# Patient Record
Sex: FEMALE | Race: WHITE | NOT HISPANIC OR LATINO | Employment: OTHER | URBAN - METROPOLITAN AREA
[De-identification: names, ages, dates, MRNs, and addresses within clinical notes are randomized per-mention and may not be internally consistent; named-entity substitution may affect disease eponyms.]

---

## 2023-06-13 ENCOUNTER — TELEPHONE (OUTPATIENT)
Dept: PRIMARY CARE | Facility: CLINIC | Age: 88
End: 2023-06-13
Payer: MEDICARE

## 2023-06-13 NOTE — TELEPHONE ENCOUNTER
She needs to take it daily and then next week at her visit we can discuss changing it to something else due to side effects  Would not risk having a stroke by leaving BP so high

## 2023-06-13 NOTE — TELEPHONE ENCOUNTER
She needs to take it daily and then next week at her visit we can discuss changing it to something else due to side effects  Would not risk having a stroke by leaving BP so high     Patient informed of the above per Dr Agrawal, will take medication daily and follow up next week

## 2023-06-13 NOTE — TELEPHONE ENCOUNTER
Patient called  to inform you that Blood pressure was 170/68 today at [facility], she has not been taking her medicine because it makes her sick to her stomach, she did take today, because [facility] wanted her to go to hospital or call her PCP, She was at [facility] in April and passed out they called the squad and she refused to go to ED she told them I am not going to die in a hospital I will die at home. She then said sometimes she takes every other day her BP med.      Her H&P is next week June 20th. Should she take daily or continue every other day until she sees you next week?

## 2023-06-16 PROBLEM — M51.36 DDD (DEGENERATIVE DISC DISEASE), LUMBAR: Status: ACTIVE | Noted: 2023-06-16

## 2023-06-16 PROBLEM — I49.3 PVC (PREMATURE VENTRICULAR CONTRACTION): Status: ACTIVE | Noted: 2023-06-16

## 2023-06-16 PROBLEM — M25.511 RIGHT SHOULDER PAIN: Status: ACTIVE | Noted: 2023-06-16

## 2023-06-16 PROBLEM — I10 BENIGN ESSENTIAL HYPERTENSION: Status: ACTIVE | Noted: 2023-06-16

## 2023-06-16 PROBLEM — K57.90 DIVERTICULOSIS: Status: ACTIVE | Noted: 2023-06-16

## 2023-06-16 PROBLEM — N18.31 STAGE 3A CHRONIC KIDNEY DISEASE (MULTI): Status: ACTIVE | Noted: 2023-06-16

## 2023-06-16 RX ORDER — COD LIVER OIL
OIL (ML) ORAL
COMMUNITY
End: 2023-06-20 | Stop reason: ALTCHOICE

## 2023-06-16 RX ORDER — ASPIRIN 81 MG/1
TABLET ORAL
COMMUNITY
End: 2023-07-11 | Stop reason: ALTCHOICE

## 2023-06-16 RX ORDER — CHLORTHALIDONE 25 MG/1
1 TABLET ORAL DAILY
COMMUNITY
Start: 2022-12-22 | End: 2023-06-20 | Stop reason: SINTOL

## 2023-06-16 RX ORDER — ACETAMINOPHEN 500 MG
TABLET ORAL
COMMUNITY
End: 2023-07-11 | Stop reason: ALTCHOICE

## 2023-06-20 ENCOUNTER — OFFICE VISIT (OUTPATIENT)
Dept: PRIMARY CARE | Facility: CLINIC | Age: 88
End: 2023-06-20
Payer: MEDICARE

## 2023-06-20 VITALS
HEIGHT: 62 IN | SYSTOLIC BLOOD PRESSURE: 142 MMHG | BODY MASS INDEX: 28.52 KG/M2 | WEIGHT: 155 LBS | DIASTOLIC BLOOD PRESSURE: 80 MMHG | RESPIRATION RATE: 23 BRPM | HEART RATE: 80 BPM

## 2023-06-20 DIAGNOSIS — M51.36 DDD (DEGENERATIVE DISC DISEASE), LUMBAR: ICD-10-CM

## 2023-06-20 DIAGNOSIS — E78.2 MIXED HYPERLIPIDEMIA: ICD-10-CM

## 2023-06-20 DIAGNOSIS — N18.31 STAGE 3A CHRONIC KIDNEY DISEASE (MULTI): ICD-10-CM

## 2023-06-20 DIAGNOSIS — Z00.00 MEDICARE ANNUAL WELLNESS VISIT, SUBSEQUENT: Primary | ICD-10-CM

## 2023-06-20 DIAGNOSIS — I49.3 PVC (PREMATURE VENTRICULAR CONTRACTION): ICD-10-CM

## 2023-06-20 DIAGNOSIS — I10 BENIGN ESSENTIAL HYPERTENSION: ICD-10-CM

## 2023-06-20 DIAGNOSIS — C50.012: ICD-10-CM

## 2023-06-20 PROCEDURE — 1160F RVW MEDS BY RX/DR IN RCRD: CPT | Performed by: INTERNAL MEDICINE

## 2023-06-20 PROCEDURE — 80061 LIPID PANEL: CPT

## 2023-06-20 PROCEDURE — 81001 URINALYSIS AUTO W/SCOPE: CPT

## 2023-06-20 PROCEDURE — 85027 COMPLETE CBC AUTOMATED: CPT

## 2023-06-20 PROCEDURE — 80053 COMPREHEN METABOLIC PANEL: CPT

## 2023-06-20 PROCEDURE — 1036F TOBACCO NON-USER: CPT | Performed by: INTERNAL MEDICINE

## 2023-06-20 PROCEDURE — G0439 PPPS, SUBSEQ VISIT: HCPCS | Performed by: INTERNAL MEDICINE

## 2023-06-20 PROCEDURE — 1170F FXNL STATUS ASSESSED: CPT | Performed by: INTERNAL MEDICINE

## 2023-06-20 PROCEDURE — 3079F DIAST BP 80-89 MM HG: CPT | Performed by: INTERNAL MEDICINE

## 2023-06-20 PROCEDURE — 3077F SYST BP >= 140 MM HG: CPT | Performed by: INTERNAL MEDICINE

## 2023-06-20 PROCEDURE — 99214 OFFICE O/P EST MOD 30 MIN: CPT | Performed by: INTERNAL MEDICINE

## 2023-06-20 PROCEDURE — 1159F MED LIST DOCD IN RCRD: CPT | Performed by: INTERNAL MEDICINE

## 2023-06-20 RX ORDER — LOSARTAN POTASSIUM 50 MG/1
50 TABLET ORAL DAILY
Qty: 90 TABLET | Refills: 1 | Status: SHIPPED
Start: 2023-06-20 | End: 2023-07-11 | Stop reason: SDUPTHER

## 2023-06-20 ASSESSMENT — ENCOUNTER SYMPTOMS
ABDOMINAL PAIN: 0
MYALGIAS: 0
EYE PAIN: 0
RECTAL PAIN: 0
HEMATURIA: 0
STRIDOR: 0
APNEA: 0
CONFUSION: 0
APPETITE CHANGE: 0
BLOOD IN STOOL: 0
PHOTOPHOBIA: 0
CHILLS: 0
TREMORS: 0
SHORTNESS OF BREATH: 0
JOINT SWELLING: 0
VOICE CHANGE: 0
TROUBLE SWALLOWING: 0
CONSTIPATION: 0
FACIAL ASYMMETRY: 0
WHEEZING: 0
EYE ITCHING: 0
HALLUCINATIONS: 0
OCCASIONAL FEELINGS OF UNSTEADINESS: 0
PALPITATIONS: 0
CHOKING: 0
BRUISES/BLEEDS EASILY: 0
WEAKNESS: 0
ARTHRALGIAS: 0
DIFFICULTY URINATING: 0
FREQUENCY: 0
AGITATION: 0
SLEEP DISTURBANCE: 0
POLYDIPSIA: 0
HEADACHES: 0
SINUS PRESSURE: 0
COUGH: 0
SEIZURES: 0
DEPRESSION: 0
DYSPHORIC MOOD: 0
NERVOUS/ANXIOUS: 0
FACIAL SWELLING: 0
DIARRHEA: 0
ADENOPATHY: 0
CHEST TIGHTNESS: 0
NAUSEA: 0
FATIGUE: 0
SORE THROAT: 0
FLANK PAIN: 0
DYSURIA: 0
SINUS PAIN: 0
NUMBNESS: 0
VOMITING: 0
SPEECH DIFFICULTY: 0
DIAPHORESIS: 0
POLYPHAGIA: 0
BACK PAIN: 0
ABDOMINAL DISTENTION: 0
ACTIVITY CHANGE: 0
EYE REDNESS: 0
DECREASED CONCENTRATION: 0
HYPERACTIVE: 0
FEVER: 0
UNEXPECTED WEIGHT CHANGE: 0
LIGHT-HEADEDNESS: 1
WOUND: 0
LOSS OF SENSATION IN FEET: 0
EYE DISCHARGE: 0
NECK STIFFNESS: 0
NECK PAIN: 0
ANAL BLEEDING: 0
COLOR CHANGE: 0
RHINORRHEA: 0
DIZZINESS: 0

## 2023-06-20 ASSESSMENT — ACTIVITIES OF DAILY LIVING (ADL)
TAKING_MEDICATION: INDEPENDENT
DRESSING: INDEPENDENT
DOING_HOUSEWORK: INDEPENDENT
GROCERY_SHOPPING: INDEPENDENT
MANAGING_FINANCES: INDEPENDENT
BATHING: INDEPENDENT

## 2023-06-20 ASSESSMENT — COLUMBIA-SUICIDE SEVERITY RATING SCALE - C-SSRS
1. IN THE PAST MONTH, HAVE YOU WISHED YOU WERE DEAD OR WISHED YOU COULD GO TO SLEEP AND NOT WAKE UP?: NO
6. HAVE YOU EVER DONE ANYTHING, STARTED TO DO ANYTHING, OR PREPARED TO DO ANYTHING TO END YOUR LIFE?: NO
2. HAVE YOU ACTUALLY HAD ANY THOUGHTS OF KILLING YOURSELF?: NO

## 2023-06-20 ASSESSMENT — PATIENT HEALTH QUESTIONNAIRE - PHQ9
SUM OF ALL RESPONSES TO PHQ9 QUESTIONS 1 AND 2: 0
2. FEELING DOWN, DEPRESSED OR HOPELESS: NOT AT ALL
2. FEELING DOWN, DEPRESSED OR HOPELESS: NOT AT ALL
1. LITTLE INTEREST OR PLEASURE IN DOING THINGS: NOT AT ALL
1. LITTLE INTEREST OR PLEASURE IN DOING THINGS: NOT AT ALL
SUM OF ALL RESPONSES TO PHQ9 QUESTIONS 1 AND 2: 0

## 2023-06-20 NOTE — PATIENT INSTRUCTIONS
Stop Chlorthalidone today  Instead start Losartan 50 mg one pill daily (this new script is at [facility]'s pharmacy)-this is for your blood pressure (start this medication tomorrow)   We did blood work today and will call with results  Watch diet-low in salt in particular  Continue checking your BP at local rec center as able  Follow up here in 3 weeks for BP check

## 2023-06-20 NOTE — ASSESSMENT & PLAN NOTE
GFR was 47 in 1/2023  On Chlorthalidone at present but having side effects  Stopping use of diuretic and will try ARB instead

## 2023-06-20 NOTE — PROGRESS NOTES
Subjective   Reason for Visit: Nehal Walden is an 88 y.o. female here for a Medicare Wellness visit.          Reviewed all medications by prescribing practitioner or clinical pharmacist (such as prescriptions, OTCs, herbal therapies and supplements) and documented in the medical record.    HPI  Pt here for MWV.  She is having issues with her current BP medication.  She feels the chlorthalidone is causing her to not urinate.      She fell recently while at [facility] (4/2023).  She thinks she passed out-this occurred after she went in sauna.  She then put her feet into hot tub and the next thing she remembers is EMS standing over her asking to transport her to hospital.  She declined going to hospital.      She has a list of BP readings from screenings she has had at local [facility]-they have nurse that comes monthly.  She has one that is 179/92, 184/94, 160/68.  She doesn't want to go to ER.      Patient Care Team:  Ana Laura ESTEVEZ DO as PCP - General  Ana Laura ESTEVEZ DO as PCP - [facility] Attributed Provider     Review of Systems   Constitutional:  Negative for activity change, appetite change, chills, diaphoresis, fatigue, fever and unexpected weight change.   HENT:  Negative for congestion, dental problem, drooling, ear discharge, ear pain, facial swelling, hearing loss, mouth sores, nosebleeds, postnasal drip, rhinorrhea, sinus pressure, sinus pain, sneezing, sore throat, tinnitus, trouble swallowing and voice change.    Eyes:  Negative for photophobia, pain, discharge, redness, itching and visual disturbance.   Respiratory:  Negative for apnea, cough, choking, chest tightness, shortness of breath, wheezing and stridor.    Cardiovascular:  Negative for chest pain, palpitations and leg swelling.   Gastrointestinal:  Negative for abdominal distention, abdominal pain, anal bleeding, blood in stool, constipation, diarrhea, nausea, rectal pain and vomiting.   Endocrine: Negative for cold intolerance, heat intolerance,  "polydipsia, polyphagia and polyuria.   Genitourinary:  Negative for decreased urine volume, difficulty urinating, dyspareunia, dysuria, enuresis, flank pain, frequency, genital sores, hematuria, menstrual problem, pelvic pain, urgency, vaginal bleeding, vaginal discharge and vaginal pain.   Musculoskeletal:  Negative for arthralgias, back pain, gait problem, joint swelling, myalgias, neck pain and neck stiffness.   Skin:  Negative for color change, pallor, rash and wound.   Allergic/Immunologic: Negative for environmental allergies.   Neurological:  Positive for light-headedness. Negative for dizziness, tremors, seizures, syncope, facial asymmetry, speech difficulty, weakness, numbness and headaches.   Hematological:  Negative for adenopathy. Does not bruise/bleed easily.   Psychiatric/Behavioral:  Negative for agitation, behavioral problems, confusion, decreased concentration, dysphoric mood, hallucinations, self-injury, sleep disturbance and suicidal ideas. The patient is not nervous/anxious and is not hyperactive.        Objective   Vitals:  /80   Pulse 80   Resp 23   Ht 1.575 m (5' 2\")   Wt 70.3 kg (155 lb)   BMI 28.35 kg/m²       Physical Exam  Constitutional:       Appearance: Normal appearance.   HENT:      Head: Normocephalic and atraumatic.      Right Ear: Tympanic membrane, ear canal and external ear normal. There is no impacted cerumen.      Left Ear: Tympanic membrane, ear canal and external ear normal. There is no impacted cerumen.      Nose: Nose normal. No congestion or rhinorrhea.      Mouth/Throat:      Mouth: Mucous membranes are moist.      Pharynx: Oropharynx is clear. No oropharyngeal exudate or posterior oropharyngeal erythema.   Eyes:      Extraocular Movements: Extraocular movements intact.      Conjunctiva/sclera: Conjunctivae normal.      Pupils: Pupils are equal, round, and reactive to light.   Neck:      Vascular: No carotid bruit.   Cardiovascular:      Rate and Rhythm: Normal " rate and regular rhythm.      Pulses: Normal pulses.      Heart sounds: Normal heart sounds. No murmur heard.  Pulmonary:      Effort: Pulmonary effort is normal. No respiratory distress.      Breath sounds: Normal breath sounds. No wheezing, rhonchi or rales.   Abdominal:      General: Abdomen is flat. Bowel sounds are normal. There is no distension.      Palpations: Abdomen is soft.      Tenderness: There is no abdominal tenderness.      Hernia: No hernia is present.   Musculoskeletal:         General: No swelling or tenderness. Normal range of motion.      Cervical back: Normal range of motion and neck supple.      Right lower leg: No edema.      Left lower leg: No edema.   Lymphadenopathy:      Cervical: No cervical adenopathy.   Skin:     General: Skin is warm and dry.      Findings: No lesion or rash.   Neurological:      General: No focal deficit present.      Mental Status: She is alert and oriented to person, place, and time.      Cranial Nerves: No cranial nerve deficit.      Sensory: No sensory deficit.      Motor: No weakness.   Psychiatric:         Mood and Affect: Mood normal.         Behavior: Behavior normal.         Thought Content: Thought content normal.         Judgment: Judgment normal.         Assessment/Plan   Problem List Items Addressed This Visit          Circulatory    Benign essential hypertension    Current Assessment & Plan     Pt having side effects from Chlorthalidone  Will stop use and start Losartan 50 mg daily instead   Encouraged low salt diet  Continue to exercise regularly   Follow up in 3 weeks for BP check          Relevant Medications    losartan (Cozaar) 50 mg tablet    Other Relevant Orders    Comprehensive Metabolic Panel    CBC    Urinalysis with Reflex Microscopic    PVC (premature ventricular contraction)       Genitourinary    Stage 3a chronic kidney disease    Current Assessment & Plan     GFR was 47 in 1/2023  On Chlorthalidone at present but having side  effects  Stopping use of diuretic and will try ARB instead          Relevant Orders    CBC    Urinalysis with Reflex Microscopic       Musculoskeletal    DDD (degenerative disc disease), lumbar    Current Assessment & Plan     No current issues  Goes to rec for exercise regularly             Other    Medicare annual wellness visit, subsequent - Primary    Current Assessment & Plan     Pt has living will and we have copy  She is not into having vaccines and declines  She doesn't wish to do any screening measures at this time          Other Visit Diagnoses       Mixed hyperlipidemia        Relevant Orders    Lipid Panel

## 2023-06-20 NOTE — ASSESSMENT & PLAN NOTE
Pt having side effects from Chlorthalidone  Will stop use and start Losartan 50 mg daily instead   Encouraged low salt diet  Continue to exercise regularly   Follow up in 3 weeks for BP check

## 2023-06-20 NOTE — ASSESSMENT & PLAN NOTE
Past history but per patient had surgery for this-unclear full history as it is all through patient encounter   No current issues  She doesn't wish to have any further imaging

## 2023-06-20 NOTE — ASSESSMENT & PLAN NOTE
Pt has living will and we have copy  She is not into having vaccines and declines  She doesn't wish to do any screening measures at this time

## 2023-06-21 LAB
ALANINE AMINOTRANSFERASE (SGPT) (U/L) IN SER/PLAS: 14 U/L (ref 7–45)
ALBUMIN (G/DL) IN SER/PLAS: 4.7 G/DL (ref 3.4–5)
ALKALINE PHOSPHATASE (U/L) IN SER/PLAS: 65 U/L (ref 33–136)
ANION GAP IN SER/PLAS: 13 MMOL/L (ref 10–20)
APPEARANCE, URINE: CLEAR
ASPARTATE AMINOTRANSFERASE (SGOT) (U/L) IN SER/PLAS: 20 U/L (ref 9–39)
BILIRUBIN TOTAL (MG/DL) IN SER/PLAS: 0.8 MG/DL (ref 0–1.2)
BILIRUBIN, URINE: NEGATIVE
BLOOD, URINE: NEGATIVE
CALCIUM (MG/DL) IN SER/PLAS: 9.8 MG/DL (ref 8.6–10.6)
CARBON DIOXIDE, TOTAL (MMOL/L) IN SER/PLAS: 30 MMOL/L (ref 21–32)
CHLORIDE (MMOL/L) IN SER/PLAS: 94 MMOL/L (ref 98–107)
CHOLESTEROL (MG/DL) IN SER/PLAS: 233 MG/DL (ref 0–199)
CHOLESTEROL IN HDL (MG/DL) IN SER/PLAS: 55.7 MG/DL
CHOLESTEROL/HDL RATIO: 4.2
COLOR, URINE: YELLOW
CREATININE (MG/DL) IN SER/PLAS: 0.98 MG/DL (ref 0.5–1.05)
ERYTHROCYTE DISTRIBUTION WIDTH (RATIO) BY AUTOMATED COUNT: 15.1 % (ref 11.5–14.5)
ERYTHROCYTE MEAN CORPUSCULAR HEMOGLOBIN CONCENTRATION (G/DL) BY AUTOMATED: 32 G/DL (ref 32–36)
ERYTHROCYTE MEAN CORPUSCULAR VOLUME (FL) BY AUTOMATED COUNT: 89 FL (ref 80–100)
ERYTHROCYTES (10*6/UL) IN BLOOD BY AUTOMATED COUNT: 4.35 X10E12/L (ref 4–5.2)
GFR FEMALE: 56 ML/MIN/1.73M2
GLUCOSE (MG/DL) IN SER/PLAS: 103 MG/DL (ref 74–99)
GLUCOSE, URINE: NEGATIVE MG/DL
HEMATOCRIT (%) IN BLOOD BY AUTOMATED COUNT: 38.8 % (ref 36–46)
HEMOGLOBIN (G/DL) IN BLOOD: 12.4 G/DL (ref 12–16)
KETONES, URINE: NEGATIVE MG/DL
LDL: 159 MG/DL (ref 0–99)
LEUKOCYTE ESTERASE, URINE: ABNORMAL
LEUKOCYTES (10*3/UL) IN BLOOD BY AUTOMATED COUNT: 5.2 X10E9/L (ref 4.4–11.3)
NITRITE, URINE: NEGATIVE
NRBC (PER 100 WBCS) BY AUTOMATED COUNT: 0 /100 WBC (ref 0–0)
PH, URINE: 7 (ref 5–8)
PLATELETS (10*3/UL) IN BLOOD AUTOMATED COUNT: 248 X10E9/L (ref 150–450)
POTASSIUM (MMOL/L) IN SER/PLAS: 3.9 MMOL/L (ref 3.5–5.3)
PROTEIN TOTAL: 7.2 G/DL (ref 6.4–8.2)
PROTEIN, URINE: NEGATIVE MG/DL
RBC, URINE: <1 /HPF (ref 0–5)
SODIUM (MMOL/L) IN SER/PLAS: 133 MMOL/L (ref 136–145)
SPECIFIC GRAVITY, URINE: 1.01 (ref 1–1.03)
SQUAMOUS EPITHELIAL CELLS, URINE: 2 /HPF
TRIGLYCERIDE (MG/DL) IN SER/PLAS: 90 MG/DL (ref 0–149)
UREA NITROGEN (MG/DL) IN SER/PLAS: 20 MG/DL (ref 6–23)
UROBILINOGEN, URINE: <2 MG/DL (ref 0–1.9)
VLDL: 18 MG/DL (ref 0–40)
WBC, URINE: 20 /HPF (ref 0–5)

## 2023-06-29 ENCOUNTER — TELEPHONE (OUTPATIENT)
Dept: PRIMARY CARE | Facility: CLINIC | Age: 88
End: 2023-06-29
Payer: MEDICARE

## 2023-06-29 NOTE — TELEPHONE ENCOUNTER
Have her try taking 1/2 to the current medication-so Losartan 25 mg daily-cut her pill in half and take 1/2 tablet daily and see if this is tolerable

## 2023-06-29 NOTE — TELEPHONE ENCOUNTER
Patient called and is unable to take medication given to her for blood pressure.  She is having naussea, her legs feel like they are on fire and she gets dizzy.  She is going to discontinue this med.  What do you recommend?  She seems very anxious and concerned about not being able to tolerate any medications.

## 2023-06-30 RX ORDER — ANASTROZOLE 1 MG/1
1 TABLET ORAL
COMMUNITY
Start: 2016-03-03 | End: 2023-07-11 | Stop reason: ALTCHOICE

## 2023-07-11 ENCOUNTER — OFFICE VISIT (OUTPATIENT)
Dept: PRIMARY CARE | Facility: CLINIC | Age: 88
End: 2023-07-11
Payer: MEDICARE

## 2023-07-11 VITALS
SYSTOLIC BLOOD PRESSURE: 150 MMHG | BODY MASS INDEX: 28.72 KG/M2 | DIASTOLIC BLOOD PRESSURE: 74 MMHG | RESPIRATION RATE: 22 BRPM | HEART RATE: 84 BPM | WEIGHT: 157 LBS

## 2023-07-11 DIAGNOSIS — I10 BENIGN ESSENTIAL HYPERTENSION: Primary | ICD-10-CM

## 2023-07-11 DIAGNOSIS — E87.1 HYPONATREMIA: ICD-10-CM

## 2023-07-11 PROCEDURE — 3077F SYST BP >= 140 MM HG: CPT | Performed by: INTERNAL MEDICINE

## 2023-07-11 PROCEDURE — 1160F RVW MEDS BY RX/DR IN RCRD: CPT | Performed by: INTERNAL MEDICINE

## 2023-07-11 PROCEDURE — 1159F MED LIST DOCD IN RCRD: CPT | Performed by: INTERNAL MEDICINE

## 2023-07-11 PROCEDURE — 1126F AMNT PAIN NOTED NONE PRSNT: CPT | Performed by: INTERNAL MEDICINE

## 2023-07-11 PROCEDURE — 3078F DIAST BP <80 MM HG: CPT | Performed by: INTERNAL MEDICINE

## 2023-07-11 PROCEDURE — 1036F TOBACCO NON-USER: CPT | Performed by: INTERNAL MEDICINE

## 2023-07-11 PROCEDURE — 99213 OFFICE O/P EST LOW 20 MIN: CPT | Performed by: INTERNAL MEDICINE

## 2023-07-11 PROCEDURE — 80048 BASIC METABOLIC PNL TOTAL CA: CPT

## 2023-07-11 RX ORDER — LOSARTAN POTASSIUM 50 MG/1
25 TABLET ORAL DAILY
Qty: 90 TABLET | Refills: 1
Start: 2023-07-11 | End: 2023-09-12

## 2023-07-11 ASSESSMENT — ENCOUNTER SYMPTOMS
NAUSEA: 0
DIARRHEA: 0
SHORTNESS OF BREATH: 0
FEVER: 0
ABDOMINAL PAIN: 0
CHEST TIGHTNESS: 0
VOMITING: 0
CONFUSION: 0
CHILLS: 0
COUGH: 0
DYSPHORIC MOOD: 0
NERVOUS/ANXIOUS: 0
CONSTIPATION: 0
DECREASED CONCENTRATION: 1
PALPITATIONS: 0
ABDOMINAL DISTENTION: 0
WHEEZING: 0
FATIGUE: 0

## 2023-07-11 NOTE — PATIENT INSTRUCTIONS
Increase dosing to 1/2 tablet in AM and 1/2 tablet in PM  See that you can tolerate this way to dose-without headaches/lightheaded/nauseous  Continue to have the rec center take your BP and write it down and bring to me (goal is <140/90 ideally)  We did labs today and will call with results  Follow up in 3 months (sooner if needed)

## 2023-07-11 NOTE — PROGRESS NOTES
Subjective   Patient ID: Nehal Walden is a 88 y.o. female who presents for Blood Pressure Check.    HPI  Pt here in follow up to HTN.  She was seen on 6/20 and we started her on Losartan 50 mg.  She tells me initially she felt nauseous and dizzy.  She called on 6/29 and we told her to try 1/2 tablet daily.  She tells me she is tolerating this dose better.  She notes when her BP is high she has cloudiness in her head.  She just talked to her sister who lives in [place] and she also has very malignant and hard to control HTN.      Review of Systems   Constitutional:  Negative for chills, fatigue and fever.   Respiratory:  Negative for cough, chest tightness, shortness of breath and wheezing.    Cardiovascular:  Negative for chest pain, palpitations and leg swelling.   Gastrointestinal:  Negative for abdominal distention, abdominal pain, constipation, diarrhea, nausea and vomiting.   Psychiatric/Behavioral:  Positive for decreased concentration (when BP high). Negative for confusion (when BP high) and dysphoric mood. The patient is not nervous/anxious.        Objective   /74   Pulse 84   Resp 22   Wt 71.2 kg (157 lb)   BMI 28.72 kg/m²    Physical Exam  Constitutional:       Appearance: Normal appearance.   Cardiovascular:      Rate and Rhythm: Normal rate and regular rhythm.      Heart sounds: Normal heart sounds.   Pulmonary:      Effort: Pulmonary effort is normal.      Breath sounds: Normal breath sounds.   Abdominal:      General: Bowel sounds are normal.   Musculoskeletal:      Right lower leg: No edema.      Left lower leg: No edema.   Neurological:      Mental Status: She is alert and oriented to person, place, and time.   Psychiatric:         Mood and Affect: Mood normal.         Assessment/Plan   Problem List Items Addressed This Visit       Benign essential hypertension - Primary     BP still high today; she claims at local rec center yesterday it was very good  She is tolerating 25 mg of Losartan  currently  I want her to try to increase dosing to 1/2 tablet twice a day to get further improvements  She was instructed to continue to have her BP checked while at rec and record it for me to view at next visit  BMP done today to check creatine          Relevant Medications    losartan (Cozaar) 50 mg tablet    Other Relevant Orders    Basic Metabolic Panel    Follow Up In Primary Care - Established     Other Visit Diagnoses       Hyponatremia        Relevant Orders    Basic Metabolic Panel

## 2023-07-11 NOTE — ASSESSMENT & PLAN NOTE
BP still high today; she claims at local rec center yesterday it was very good  She is tolerating 25 mg of Losartan currently  I want her to try to increase dosing to 1/2 tablet twice a day to get further improvements  She was instructed to continue to have her BP checked while at rec and record it for me to view at next visit  BMP done today to check creatine

## 2023-07-12 LAB
ANION GAP IN SER/PLAS: 14 MMOL/L (ref 10–20)
CALCIUM (MG/DL) IN SER/PLAS: 9.7 MG/DL (ref 8.6–10.6)
CARBON DIOXIDE, TOTAL (MMOL/L) IN SER/PLAS: 27 MMOL/L (ref 21–32)
CHLORIDE (MMOL/L) IN SER/PLAS: 98 MMOL/L (ref 98–107)
CREATININE (MG/DL) IN SER/PLAS: 1.06 MG/DL (ref 0.5–1.05)
GFR FEMALE: 51 ML/MIN/1.73M2
GLUCOSE (MG/DL) IN SER/PLAS: 80 MG/DL (ref 74–99)
POTASSIUM (MMOL/L) IN SER/PLAS: 5 MMOL/L (ref 3.5–5.3)
SODIUM (MMOL/L) IN SER/PLAS: 134 MMOL/L (ref 136–145)
UREA NITROGEN (MG/DL) IN SER/PLAS: 22 MG/DL (ref 6–23)

## 2023-09-12 DIAGNOSIS — I10 BENIGN ESSENTIAL HYPERTENSION: ICD-10-CM

## 2023-09-12 RX ORDER — LOSARTAN POTASSIUM 50 MG/1
25 TABLET ORAL 2 TIMES DAILY
Qty: 90 TABLET | Refills: 1 | Status: SHIPPED | OUTPATIENT
Start: 2023-09-12 | End: 2023-11-14 | Stop reason: SDUPTHER

## 2023-09-12 RX ORDER — LOSARTAN POTASSIUM 50 MG/1
50 TABLET ORAL DAILY
Qty: 90 TABLET | Refills: 1 | Status: SHIPPED | OUTPATIENT
Start: 2023-09-12 | End: 2023-09-12 | Stop reason: SDUPTHER

## 2023-10-12 ENCOUNTER — APPOINTMENT (OUTPATIENT)
Dept: PRIMARY CARE | Facility: CLINIC | Age: 88
End: 2023-10-12
Payer: MEDICARE

## 2023-11-06 ENCOUNTER — APPOINTMENT (OUTPATIENT)
Dept: PRIMARY CARE | Facility: CLINIC | Age: 88
End: 2023-11-06
Payer: MEDICARE

## 2023-11-14 ENCOUNTER — OFFICE VISIT (OUTPATIENT)
Dept: PRIMARY CARE | Facility: CLINIC | Age: 88
End: 2023-11-14
Payer: MEDICARE

## 2023-11-14 VITALS — DIASTOLIC BLOOD PRESSURE: 76 MMHG | HEART RATE: 84 BPM | SYSTOLIC BLOOD PRESSURE: 150 MMHG

## 2023-11-14 DIAGNOSIS — I10 BENIGN ESSENTIAL HYPERTENSION: ICD-10-CM

## 2023-11-14 DIAGNOSIS — M51.36 DDD (DEGENERATIVE DISC DISEASE), LUMBAR: Primary | ICD-10-CM

## 2023-11-14 PROCEDURE — 1036F TOBACCO NON-USER: CPT | Performed by: INTERNAL MEDICINE

## 2023-11-14 PROCEDURE — 1159F MED LIST DOCD IN RCRD: CPT | Performed by: INTERNAL MEDICINE

## 2023-11-14 PROCEDURE — 99214 OFFICE O/P EST MOD 30 MIN: CPT | Performed by: INTERNAL MEDICINE

## 2023-11-14 PROCEDURE — 3078F DIAST BP <80 MM HG: CPT | Performed by: INTERNAL MEDICINE

## 2023-11-14 PROCEDURE — 1126F AMNT PAIN NOTED NONE PRSNT: CPT | Performed by: INTERNAL MEDICINE

## 2023-11-14 PROCEDURE — 1160F RVW MEDS BY RX/DR IN RCRD: CPT | Performed by: INTERNAL MEDICINE

## 2023-11-14 PROCEDURE — 3077F SYST BP >= 140 MM HG: CPT | Performed by: INTERNAL MEDICINE

## 2023-11-14 RX ORDER — LOSARTAN POTASSIUM 50 MG/1
50 TABLET ORAL 2 TIMES DAILY
Qty: 180 TABLET | Refills: 1 | Status: SHIPPED | OUTPATIENT
Start: 2023-11-14 | End: 2024-02-15 | Stop reason: SDUPTHER

## 2023-11-14 ASSESSMENT — ENCOUNTER SYMPTOMS
CONSTIPATION: 0
DIARRHEA: 0
DIFFICULTY URINATING: 0
VOMITING: 0
HEADACHES: 0
COUGH: 0
CHILLS: 0
SLEEP DISTURBANCE: 0
WHEEZING: 0
FREQUENCY: 0
BACK PAIN: 1
DIZZINESS: 0
UNEXPECTED WEIGHT CHANGE: 0
FEVER: 0
FATIGUE: 0
SHORTNESS OF BREATH: 0
NERVOUS/ANXIOUS: 0
CHEST TIGHTNESS: 0
ABDOMINAL PAIN: 0
DYSURIA: 0
FLANK PAIN: 0
PALPITATIONS: 0
LIGHT-HEADEDNESS: 0
APPETITE CHANGE: 0
ACTIVITY CHANGE: 0
DYSPHORIC MOOD: 0
ARTHRALGIAS: 0
NAUSEA: 0

## 2023-11-14 NOTE — ASSESSMENT & PLAN NOTE
She has chronic low back pain that she manages with aspirin use for pain and exercising/swimming at [facility]  Gave her print off of low back stretches she can do at home to further assist

## 2023-11-14 NOTE — PATIENT INSTRUCTIONS
Increase Losartan dose to full tablet (50 mg) twice a day (in AM with breakfast and in PM (after dinner))-I sent in new prescription to the [facility] that gives you enough pills for this new dose  Continue to have the rec center take your BP and record it  Continue to exercise and be as active as able  Lessen salt and caffeine in diet if you consume a large amount  Stretch and continues to exercise your low back  Follow up here in 3 months

## 2023-11-14 NOTE — ASSESSMENT & PLAN NOTE
Her BP is still high  Will increase her Losartan to 50 mg twice a day-new rx given  She will continue to have it tested through recreation center

## 2023-11-14 NOTE — PROGRESS NOTES
Subjective   Patient ID: Nehal Walden is a 88 y.o. female who presents for Follow-up (3m).    HPI    Pt here in follow up.  She continues to have high BP.  She has BP readings from the [facility] center and today she was noted to have 150/78, 143/65 and 167/79.  She tells me sometimes she is taking the full dose Losartan 50 mg in AM instead of splitting the dose.  She took the full dose 50 mg this AM of her Losartan.      She is having more back pain.  No referral of pain to buttock or legs.  She does not have weakness or numbness of legs.  She doesn't have pain when sitting/laying down-only when standing for long period of time.      She declines all vaccines.    Review of Systems   Constitutional:  Negative for activity change, appetite change, chills, fatigue, fever and unexpected weight change.   Respiratory:  Negative for cough, chest tightness, shortness of breath and wheezing.    Cardiovascular:  Negative for chest pain, palpitations and leg swelling.   Gastrointestinal:  Negative for abdominal pain, constipation, diarrhea, nausea and vomiting.   Genitourinary:  Negative for difficulty urinating, dysuria, flank pain and frequency.   Musculoskeletal:  Positive for back pain. Negative for arthralgias.   Neurological:  Negative for dizziness, light-headedness and headaches.   Psychiatric/Behavioral:  Negative for dysphoric mood and sleep disturbance. The patient is not nervous/anxious.        Objective   /76   Pulse 84    Physical Exam  Constitutional:       Appearance: Normal appearance.   Cardiovascular:      Rate and Rhythm: Normal rate and regular rhythm.      Heart sounds: Normal heart sounds.   Pulmonary:      Effort: Pulmonary effort is normal.      Breath sounds: Normal breath sounds.   Abdominal:      General: Bowel sounds are normal.   Musculoskeletal:      Right lower leg: No edema.      Left lower leg: No edema.   Neurological:      Mental Status: She is alert and oriented to person, place, and  time.   Psychiatric:         Mood and Affect: Mood normal.         Assessment/Plan   Problem List Items Addressed This Visit       Benign essential hypertension     Her BP is still high  Will increase her Losartan to 50 mg twice a day-new rx given  She will continue to have it tested through [place] center           Relevant Medications    losartan (Cozaar) 50 mg tablet    Other Relevant Orders    Follow Up In Primary Care - Established    DDD (degenerative disc disease), lumbar - Primary     She has chronic low back pain that she manages with aspirin use for pain and exercising/swimming at [facility]  Gave her print off of low back stretches she can do at home to further assist

## 2024-02-15 ENCOUNTER — OFFICE VISIT (OUTPATIENT)
Dept: PRIMARY CARE | Facility: CLINIC | Age: 89
End: 2024-02-15
Payer: MEDICARE

## 2024-02-15 VITALS — SYSTOLIC BLOOD PRESSURE: 170 MMHG | HEART RATE: 64 BPM | DIASTOLIC BLOOD PRESSURE: 80 MMHG

## 2024-02-15 DIAGNOSIS — N18.31 STAGE 3A CHRONIC KIDNEY DISEASE (MULTI): Primary | ICD-10-CM

## 2024-02-15 DIAGNOSIS — I10 BENIGN ESSENTIAL HYPERTENSION: ICD-10-CM

## 2024-02-15 DIAGNOSIS — C50.012: ICD-10-CM

## 2024-02-15 LAB
ANION GAP SERPL CALC-SCNC: 16 MMOL/L (ref 10–20)
BUN SERPL-MCNC: 23 MG/DL (ref 6–23)
CALCIUM SERPL-MCNC: 10.3 MG/DL (ref 8.6–10.6)
CHLORIDE SERPL-SCNC: 97 MMOL/L (ref 98–107)
CO2 SERPL-SCNC: 25 MMOL/L (ref 21–32)
CREAT SERPL-MCNC: 1.29 MG/DL (ref 0.5–1.05)
EGFRCR SERPLBLD CKD-EPI 2021: 40 ML/MIN/1.73M*2
GLUCOSE SERPL-MCNC: 88 MG/DL (ref 74–99)
POTASSIUM SERPL-SCNC: 4.6 MMOL/L (ref 3.5–5.3)
SODIUM SERPL-SCNC: 133 MMOL/L (ref 136–145)

## 2024-02-15 PROCEDURE — 1160F RVW MEDS BY RX/DR IN RCRD: CPT | Performed by: INTERNAL MEDICINE

## 2024-02-15 PROCEDURE — 1036F TOBACCO NON-USER: CPT | Performed by: INTERNAL MEDICINE

## 2024-02-15 PROCEDURE — 1126F AMNT PAIN NOTED NONE PRSNT: CPT | Performed by: INTERNAL MEDICINE

## 2024-02-15 PROCEDURE — 36415 COLL VENOUS BLD VENIPUNCTURE: CPT

## 2024-02-15 PROCEDURE — 3077F SYST BP >= 140 MM HG: CPT | Performed by: INTERNAL MEDICINE

## 2024-02-15 PROCEDURE — 3079F DIAST BP 80-89 MM HG: CPT | Performed by: INTERNAL MEDICINE

## 2024-02-15 PROCEDURE — 99214 OFFICE O/P EST MOD 30 MIN: CPT | Performed by: INTERNAL MEDICINE

## 2024-02-15 PROCEDURE — 1159F MED LIST DOCD IN RCRD: CPT | Performed by: INTERNAL MEDICINE

## 2024-02-15 PROCEDURE — 1157F ADVNC CARE PLAN IN RCRD: CPT | Performed by: INTERNAL MEDICINE

## 2024-02-15 PROCEDURE — 80048 BASIC METABOLIC PNL TOTAL CA: CPT

## 2024-02-15 RX ORDER — LOSARTAN POTASSIUM 50 MG/1
50 TABLET ORAL 2 TIMES DAILY
Qty: 180 TABLET | Refills: 1 | Status: SHIPPED
Start: 2024-02-15 | End: 2024-03-14 | Stop reason: SINTOL

## 2024-02-15 ASSESSMENT — ENCOUNTER SYMPTOMS
HEADACHES: 0
DIARRHEA: 0
CHILLS: 0
DIZZINESS: 0
FEVER: 0
COUGH: 0
WHEEZING: 0
FATIGUE: 0
RHINORRHEA: 0
SHORTNESS OF BREATH: 0
UNEXPECTED WEIGHT CHANGE: 0
CONSTIPATION: 0
PALPITATIONS: 0
APPETITE CHANGE: 0
ACTIVITY CHANGE: 0
LIGHT-HEADEDNESS: 0
ABDOMINAL PAIN: 0
VOMITING: 0
CHEST TIGHTNESS: 0
NAUSEA: 0

## 2024-02-15 NOTE — ASSESSMENT & PLAN NOTE
BP still high  Pt has element of white coat syndrome  When BP taken outside office it is better  She doesn't wish to escalate medications/dosing at this time  She remains active

## 2024-02-15 NOTE — PATIENT INSTRUCTIONS
Continue medication for blood pressure as directed; refills done today to local pharmacy   Continue checking your blood pressure at home/rec center-goal is <150/90   Continue to stay active as you are   We did some blood work today and will call with results   Follow up in June for physical

## 2024-02-15 NOTE — PROGRESS NOTES
Subjective   Patient ID: Nehal Walden is a 88 y.o. female who presents for Follow-up (3m).    HPI  Pt here in follow up.  She was very sick last month with cough, headache, nasal congestion and a large amount of drainage/mucous.  She took some aspirin and per patient then she had vomiting and diarrhea.  She continued to feel bad for 3 weeks.  She still has some residual mucous-worse after eating.      Her BP continues to be high.  She is taking her medication as directed.  She continues to have it checked periodically when at local rec center at least monthly-last month was 144/72.      Review of Systems   Constitutional:  Negative for activity change, appetite change, chills, fatigue, fever and unexpected weight change.   HENT:  Negative for congestion, postnasal drip and rhinorrhea.    Respiratory:  Negative for cough, chest tightness, shortness of breath and wheezing.    Cardiovascular:  Negative for chest pain, palpitations and leg swelling.   Gastrointestinal:  Negative for abdominal pain, constipation, diarrhea, nausea and vomiting.   Neurological:  Negative for dizziness, light-headedness and headaches.       Objective   /80   Pulse 64    Physical Exam  Constitutional:       Appearance: Normal appearance.   Cardiovascular:      Rate and Rhythm: Normal rate and regular rhythm.      Heart sounds: Normal heart sounds.   Pulmonary:      Effort: Pulmonary effort is normal.      Breath sounds: Normal breath sounds.   Lymphadenopathy:      Cervical: No cervical adenopathy.   Neurological:      Mental Status: She is alert.         Assessment/Plan   Problem List Items Addressed This Visit       Benign essential hypertension     BP still high  Pt has element of white coat syndrome  When BP taken outside office it is better  She doesn't wish to escalate medications/dosing at this time  She remains active         Relevant Medications    losartan (Cozaar) 50 mg tablet    Other Relevant Orders    Basic Metabolic  Panel    Follow Up In Primary Care - Medicare Annual    Stage 3a chronic kidney disease (CMS/HCC) - Primary     Secondary to HTN  GFR 51  Repeat levels today         Relevant Orders    Basic Metabolic Panel    Follow Up In Primary Care - Medicare Annual    Malignant neoplasm involving both nipple and 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

## 2024-02-16 ENCOUNTER — TELEPHONE (OUTPATIENT)
Dept: PRIMARY CARE | Facility: CLINIC | Age: 89
End: 2024-02-16
Payer: COMMERCIAL

## 2024-02-16 NOTE — TELEPHONE ENCOUNTER
----- Message from Ana Laura ESTEVEZ DO sent at 2/16/2024  7:53 AM EST -----  Pts kidney function is worse compared to 1 year ago-this is due to high blood pressure and not treating it fully; see if she is ok with me adding to Losartan; I really would like to get her pressure under better control if possible-if she resists as long as she understands the high pressures are hurting her kidneys she can continue as is due to age

## 2024-02-16 NOTE — TELEPHONE ENCOUNTER
I did speak with Nehal and discussed her results, she is agreeable to adding another blood pressure med, please send to [facility].

## 2024-02-19 DIAGNOSIS — I10 BENIGN ESSENTIAL HYPERTENSION: Primary | ICD-10-CM

## 2024-02-19 RX ORDER — AMLODIPINE BESYLATE 5 MG/1
5 TABLET ORAL DAILY
Qty: 90 TABLET | Refills: 1 | Status: SHIPPED
Start: 2024-02-19 | End: 2024-05-06 | Stop reason: SINTOL

## 2024-02-19 NOTE — TELEPHONE ENCOUNTER
I want to add Amlodipine 5 mg daily to her Losartan 50 mg twice a day  She may have been on this in the past but want her to try again-this one can be taken with the Losartan and anytime of day so she can pick  Want to see her back in 4-6 weeks for visit/BP check

## 2024-02-19 NOTE — TELEPHONE ENCOUNTER
I spoke with Nehal and she will start Amlodipine 5 mg daily. She said she is stressed and overwhelmed, she did make an office visit in 4 weeks and will follow up then.

## 2024-03-12 ENCOUNTER — TELEPHONE (OUTPATIENT)
Dept: PRIMARY CARE | Facility: CLINIC | Age: 89
End: 2024-03-12
Payer: COMMERCIAL

## 2024-03-12 NOTE — TELEPHONE ENCOUNTER
Nehal's blood pressure is 172/90 today she did go swimming this morning, and yesterday it was 206/77, she says she is taking her blood pressure med's.         [place]- neighbor

## 2024-03-12 NOTE — TELEPHONE ENCOUNTER
I spoke with Nehal's neighbor Belle who is with Nehal now, and she is taking the Losartan but not the Amlodipine, says when she takes Amlodipine she passes out, I informed Dr. Agrawal and she said Nehal needs to take the amlodipine 5 mg NOW   and then repeat her BP in 2 hours and call us with a reading. Nehal was agreeable and will call us in 2 hours with  BP readings.

## 2024-03-12 NOTE — TELEPHONE ENCOUNTER
Nehal called back after taking the amlodipine 5 mg and her BP is still 170/96. After speaking with Dr. Agrawal she suggested she checks her Blood pressure again but at local fire station or pharmacy, she doesn't trust that her cuff is accurate. Patient is agreeable and will have it rechecked.

## 2024-03-14 ENCOUNTER — OFFICE VISIT (OUTPATIENT)
Dept: PRIMARY CARE | Facility: CLINIC | Age: 89
End: 2024-03-14
Payer: MEDICARE

## 2024-03-14 VITALS — HEART RATE: 88 BPM | SYSTOLIC BLOOD PRESSURE: 150 MMHG | DIASTOLIC BLOOD PRESSURE: 74 MMHG

## 2024-03-14 DIAGNOSIS — J06.9 URI WITH COUGH AND CONGESTION: ICD-10-CM

## 2024-03-14 DIAGNOSIS — I10 BENIGN ESSENTIAL HYPERTENSION: Primary | ICD-10-CM

## 2024-03-14 DIAGNOSIS — N18.31 STAGE 3A CHRONIC KIDNEY DISEASE (MULTI): ICD-10-CM

## 2024-03-14 PROCEDURE — 1157F ADVNC CARE PLAN IN RCRD: CPT | Performed by: INTERNAL MEDICINE

## 2024-03-14 PROCEDURE — 1036F TOBACCO NON-USER: CPT | Performed by: INTERNAL MEDICINE

## 2024-03-14 PROCEDURE — 99214 OFFICE O/P EST MOD 30 MIN: CPT | Performed by: INTERNAL MEDICINE

## 2024-03-14 PROCEDURE — 1159F MED LIST DOCD IN RCRD: CPT | Performed by: INTERNAL MEDICINE

## 2024-03-14 PROCEDURE — 1160F RVW MEDS BY RX/DR IN RCRD: CPT | Performed by: INTERNAL MEDICINE

## 2024-03-14 PROCEDURE — 3078F DIAST BP <80 MM HG: CPT | Performed by: INTERNAL MEDICINE

## 2024-03-14 PROCEDURE — 3077F SYST BP >= 140 MM HG: CPT | Performed by: INTERNAL MEDICINE

## 2024-03-14 RX ORDER — LOSARTAN POTASSIUM 50 MG/1
50 TABLET ORAL 2 TIMES DAILY
Qty: 180 TABLET | Refills: 1
Start: 2024-03-14 | End: 2024-04-03 | Stop reason: SINTOL

## 2024-03-14 RX ORDER — HYDRALAZINE HYDROCHLORIDE 25 MG/1
25 TABLET, FILM COATED ORAL 2 TIMES DAILY
Qty: 180 TABLET | Refills: 0 | Status: SHIPPED
Start: 2024-03-14 | End: 2024-03-14 | Stop reason: ENTERED-IN-ERROR

## 2024-03-14 RX ORDER — AZITHROMYCIN 250 MG/1
TABLET, FILM COATED ORAL
Qty: 1 TABLET | Refills: 0 | Status: SHIPPED
Start: 2024-03-14 | End: 2024-04-03 | Stop reason: WASHOUT

## 2024-03-14 ASSESSMENT — ENCOUNTER SYMPTOMS
CHILLS: 0
VOMITING: 0
HYPERTENSION: 1
HEADACHES: 1
CHOKING: 0
WHEEZING: 0
COUGH: 1
ABDOMINAL PAIN: 0
CHEST TIGHTNESS: 0
UNEXPECTED WEIGHT CHANGE: 0
APPETITE CHANGE: 0
DIZZINESS: 0
SHORTNESS OF BREATH: 0
APNEA: 0
RECTAL PAIN: 0
PALPITATIONS: 0
STRIDOR: 0
NAUSEA: 1
FEVER: 0
ABDOMINAL DISTENTION: 0
LIGHT-HEADEDNESS: 0
DIARRHEA: 0
ACTIVITY CHANGE: 0
CONSTIPATION: 0
RHINORRHEA: 1
FATIGUE: 0

## 2024-03-14 NOTE — TELEPHONE ENCOUNTER
I reached out to Nehal, she is still having elevated pressures, she is taking both med's, 179/77. She said it was 206 the top number the other day. 1 day was like that. Then yesterday was 148/77 and 179/69 when she woke up to today. She is tired. And her left  leg is burning and feels like ants crawling on her.  She has a 1 month follow up next Tuesday.

## 2024-03-14 NOTE — PROGRESS NOTES
Subjective   Patient ID: Nehal Walden is a 88 y.o. female who presents for Hypertension (Blood pressure check/).    Hypertension  Associated symptoms include headaches. Pertinent negatives include no chest pain, palpitations or shortness of breath.     Pt here in acute visit.  She has runny nose/congestion and dry cough.  She tells me she feels like she has infection.  She also has a bad sore throat.  The cough is productive-mucous.  She has had this for several months but at times it improves.      She had her BP checked at [facility] and it was 200 systolic.  They call EMS and want her to go to hospital but she declines.  She called the other day and we instructed her to go to fire station/pharmacy to get another BP reading.  She had high reading again.      She is having more burning her her legs.  They feel like they are on fire.  She has known DDD.      Review of Systems   Constitutional:  Negative for activity change, appetite change, chills, fatigue, fever and unexpected weight change.   HENT:  Positive for postnasal drip and rhinorrhea.    Respiratory:  Positive for cough. Negative for apnea, choking, chest tightness, shortness of breath, wheezing and stridor.    Cardiovascular:  Negative for chest pain, palpitations and leg swelling.   Gastrointestinal:  Positive for nausea. Negative for abdominal distention, abdominal pain, constipation, diarrhea, rectal pain and vomiting.   Neurological:  Positive for headaches. Negative for dizziness and light-headedness.       Objective   /74   Pulse 88    Physical Exam  Constitutional:       Appearance: Normal appearance.   HENT:      Right Ear: Tympanic membrane normal.      Left Ear: Tympanic membrane normal.      Nose: Rhinorrhea present.   Cardiovascular:      Rate and Rhythm: Normal rate and regular rhythm.      Heart sounds: Normal heart sounds.   Pulmonary:      Effort: Pulmonary effort is normal.      Breath sounds: Normal breath sounds. No wheezing,  rhonchi or rales.   Chest:      Chest wall: No tenderness.   Musculoskeletal:      Right lower leg: No edema.      Left lower leg: No edema.   Lymphadenopathy:      Cervical: No cervical adenopathy.   Neurological:      Mental Status: She is alert and oriented to person, place, and time.   Psychiatric:         Mood and Affect: Mood normal.         Assessment/Plan   Problem List Items Addressed This Visit       Benign essential hypertension - Primary     BP is not that bad today  Would like to hold off on changes as she is fighting symptoms of allergies and/or URI or symptoms from the Losartan   Will treat for infection and see if this helps improve BP  For now continue Losartan twice a day and Amlodipine daily  Would like her to hold off on returning to local rec until we get this under better control          Relevant Medications    losartan (Cozaar) 50 mg tablet    Other Relevant Orders    Follow Up In Primary Care - Established    Stage 3a chronic kidney disease (CMS/HCC)     Other Visit Diagnoses       URI with cough and congestion        Relevant Medications    azithromycin (Zithromax Z-Jose) 250 mg tablet    Other Relevant Orders    Follow Up In Primary Care - Established

## 2024-03-14 NOTE — PATIENT INSTRUCTIONS
Continue Losartan 50 mg twice a day for now   Continue taking Amlodipine 5 mg a day   Continue home blood pressure readings with goal of <150/90   Take zithromax for concern for infection   If you are no better after 10 days we will have to change BP meds  Follow up in 2 weeks

## 2024-03-14 NOTE — ASSESSMENT & PLAN NOTE
BP is not that bad today  Would like to hold off on changes as she is fighting symptoms of allergies and/or URI or symptoms from the Losartan   Will treat for infection and see if this helps improve BP  For now continue Losartan twice a day and Amlodipine daily  Would like her to hold off on returning to local rec until we get this under better control

## 2024-03-19 ENCOUNTER — APPOINTMENT (OUTPATIENT)
Dept: PRIMARY CARE | Facility: CLINIC | Age: 89
End: 2024-03-19
Payer: MEDICARE

## 2024-03-25 ENCOUNTER — TELEPHONE (OUTPATIENT)
Dept: PRIMARY CARE | Facility: CLINIC | Age: 89
End: 2024-03-25
Payer: COMMERCIAL

## 2024-03-25 NOTE — TELEPHONE ENCOUNTER
Noted; pt may be having mucous/URI symptoms from the Losartan which we discussed at her visit but patient felt she was likely just sick.  The zithromax we gave her should help if it was a bacterial infection causing symptoms   Ideally would like to see her but if declines will wait until April visit  She should be on Losartan 2 pills a day and 1 Amlodipine daily

## 2024-03-25 NOTE — TELEPHONE ENCOUNTER
"Patient left a message on our voice mail, stating \"Not feeling well\" I called her and she was complaining of not being able to bring up phlegm, Dr Agrawal said to make her an appointment today, I offered and she said No. \"Only God can be in charge of me.\" Then she tells me she increased her medication now she takes 2 pills, I asked which one, so she went and grabbed the bottle it is Losartan. She increased it because her BP was 284/72. Dr. Agrawal thought she might not feel well due to her medicine. I kept offering her an appointment and she said she has one 04/03/24 and will just see her then, she has been up since 3:00 AM and she is tired, she needs her rest. I said if you don't feel well she can see you today,  isn't that why you called the office? She declined an appointment again and repeated I will just see her 04/03/24 and thanked me for being so caring and helpful.              "

## 2024-04-03 ENCOUNTER — OFFICE VISIT (OUTPATIENT)
Dept: PRIMARY CARE | Facility: CLINIC | Age: 89
End: 2024-04-03
Payer: MEDICARE

## 2024-04-03 VITALS — HEART RATE: 64 BPM | DIASTOLIC BLOOD PRESSURE: 84 MMHG | SYSTOLIC BLOOD PRESSURE: 156 MMHG

## 2024-04-03 DIAGNOSIS — N18.31 STAGE 3A CHRONIC KIDNEY DISEASE (MULTI): ICD-10-CM

## 2024-04-03 DIAGNOSIS — I10 BENIGN ESSENTIAL HYPERTENSION: Primary | ICD-10-CM

## 2024-04-03 PROCEDURE — 99213 OFFICE O/P EST LOW 20 MIN: CPT | Performed by: INTERNAL MEDICINE

## 2024-04-03 PROCEDURE — 1160F RVW MEDS BY RX/DR IN RCRD: CPT | Performed by: INTERNAL MEDICINE

## 2024-04-03 PROCEDURE — 3077F SYST BP >= 140 MM HG: CPT | Performed by: INTERNAL MEDICINE

## 2024-04-03 PROCEDURE — 3079F DIAST BP 80-89 MM HG: CPT | Performed by: INTERNAL MEDICINE

## 2024-04-03 PROCEDURE — 1159F MED LIST DOCD IN RCRD: CPT | Performed by: INTERNAL MEDICINE

## 2024-04-03 PROCEDURE — 1157F ADVNC CARE PLAN IN RCRD: CPT | Performed by: INTERNAL MEDICINE

## 2024-04-03 RX ORDER — METOPROLOL SUCCINATE 25 MG/1
12.5 TABLET, EXTENDED RELEASE ORAL DAILY
Qty: 30 TABLET | Refills: 0 | Status: SHIPPED | OUTPATIENT
Start: 2024-04-03 | End: 2024-04-29 | Stop reason: SDUPTHER

## 2024-04-03 ASSESSMENT — ENCOUNTER SYMPTOMS
DIZZINESS: 0
WHEEZING: 0
SHORTNESS OF BREATH: 0
LIGHT-HEADEDNESS: 0
APNEA: 0
RHINORRHEA: 1
PALPITATIONS: 0
CHEST TIGHTNESS: 0
CHOKING: 0
COUGH: 1
STRIDOR: 0
HEADACHES: 0

## 2024-04-03 NOTE — ASSESSMENT & PLAN NOTE
Pt with resistant HTN  She is very sensitive to medications and has had side effects almost to all  We will stop Losartan due to potential of it causing her increase mucous and URI symptoms  Continue Amlodipine for now at 5 mg as she had edema at 10 mg dose  Start Metoprolol-1/2 tablet (12.5 mg) daily for now and will titrate as able  Follow up in 1 month

## 2024-04-03 NOTE — PROGRESS NOTES
Subjective   Patient ID: Nehal Walden is a 88 y.o. female who presents for Follow-up and Cough.    Cough  Associated symptoms include rhinorrhea. Pertinent negatives include no chest pain, headaches, shortness of breath or wheezing.     Pt here in follow up to BP/mucous/cough.  She tells me she feels better since doing the Zpack but she still has large amount of mucous.  This can cause her to wake up overnight to try to spit it out. She has a hard time getting it up and out and she is choking on it.      Review of Systems   HENT:  Positive for rhinorrhea.    Respiratory:  Positive for cough. Negative for apnea, choking, chest tightness, shortness of breath, wheezing and stridor.    Cardiovascular:  Negative for chest pain, palpitations and leg swelling.   Neurological:  Negative for dizziness, light-headedness and headaches.       Objective   /84 (BP Location: Right arm, Patient Position: Sitting)   Pulse 64    Physical Exam  Constitutional:       Appearance: Normal appearance.   HENT:      Right Ear: Tympanic membrane normal.      Left Ear: Tympanic membrane normal.      Nose: No congestion or rhinorrhea.   Cardiovascular:      Rate and Rhythm: Normal rate and regular rhythm.      Heart sounds: Normal heart sounds.   Pulmonary:      Effort: Pulmonary effort is normal. No respiratory distress.      Breath sounds: Normal breath sounds. No stridor. No wheezing, rhonchi or rales.   Chest:      Chest wall: No tenderness.   Lymphadenopathy:      Cervical: No cervical adenopathy.   Neurological:      Mental Status: She is alert and oriented to person, place, and time.   Psychiatric:         Mood and Affect: Mood normal.         Assessment/Plan   Problem List Items Addressed This Visit       Benign essential hypertension - Primary     Pt with resistant HTN  She is very sensitive to medications and has had side effects almost to all  We will stop Losartan due to potential of it causing her increase mucous and URI  symptoms  Continue Amlodipine for now at 5 mg as she had edema at 10 mg dose  Start Metoprolol-1/2 tablet (12.5 mg) daily for now and will titrate as able  Follow up in 1 month         Relevant Medications    metoprolol succinate XL (Toprol-XL) 25 mg 24 hr tablet    Other Relevant Orders    Follow Up In Primary Care - Established    Stage 3a chronic kidney disease (CMS/HCC)    Relevant Orders    Follow Up In Primary Care - Established

## 2024-04-03 NOTE — PATIENT INSTRUCTIONS
Stop Losartan 50 mg now   Continue Amlodipine 5 mg daily  Start Metoprolol 25 mg but take only 1/2 tablet daily (can take with your Amlodipine at same time or can take separately)  Continue home BP checks with goal <160/90   If you feel ok with new medication can start back at the [facility] center next week (Monday)  Follow up in 1 month

## 2024-04-05 ENCOUNTER — TELEPHONE (OUTPATIENT)
Dept: PRIMARY CARE | Facility: CLINIC | Age: 89
End: 2024-04-05
Payer: COMMERCIAL

## 2024-04-05 NOTE — TELEPHONE ENCOUNTER
Karina from [facility] rec center called regarding the note you wrote that she is ok to return to exercise. The problem is not exercise, Nehal uses the Sauna/Spa/ and Steam rooms. She stays in all 3 for hours. That is why she keeps passing out, and they have had to call EMS 2 times recently. She is not aware of the time and staff has to remind her, Karina says she is forgetful and stays in there. The Legal Dept for the recreation center said this is a health concern her going into these Sauna's. These rooms have temps of 180/190 140 and 104 degrees.             [facility]  [phone]

## 2024-04-05 NOTE — TELEPHONE ENCOUNTER
I spoke with Nehal and she went to rec today and only used the pool, she will not us the Sauna/Spa/or steam. She understands and is agreeable.

## 2024-04-05 NOTE — TELEPHONE ENCOUNTER
Ok please call the patient and let her know I said she can use the rec center pool and any of the equipment but for right now I do not want her in sauna/steam rooms due to how this impacts the blood vessels  which will impact the blood pressure and will cause her to be dehydrated and can cause syncope (passing out episodes)

## 2024-04-29 DIAGNOSIS — I10 BENIGN ESSENTIAL HYPERTENSION: ICD-10-CM

## 2024-04-29 RX ORDER — METOPROLOL SUCCINATE 25 MG/1
25 TABLET, EXTENDED RELEASE ORAL DAILY
Qty: 90 TABLET | Refills: 1 | Status: SHIPPED
Start: 2024-04-29 | End: 2024-05-06 | Stop reason: SDUPTHER

## 2024-04-29 NOTE — TELEPHONE ENCOUNTER
Patient needs refill Metoprolol 25 mg, she is now taking 1 daily.  Her chart says 12.5 mg needs updated.

## 2024-05-06 ENCOUNTER — OFFICE VISIT (OUTPATIENT)
Dept: PRIMARY CARE | Facility: CLINIC | Age: 89
End: 2024-05-06
Payer: MEDICARE

## 2024-05-06 VITALS
SYSTOLIC BLOOD PRESSURE: 152 MMHG | DIASTOLIC BLOOD PRESSURE: 70 MMHG | BODY MASS INDEX: 30.42 KG/M2 | HEART RATE: 76 BPM | WEIGHT: 166.3 LBS

## 2024-05-06 DIAGNOSIS — R06.02 SOB (SHORTNESS OF BREATH) ON EXERTION: ICD-10-CM

## 2024-05-06 DIAGNOSIS — I10 BENIGN ESSENTIAL HYPERTENSION: Primary | ICD-10-CM

## 2024-05-06 DIAGNOSIS — N18.31 STAGE 3A CHRONIC KIDNEY DISEASE (MULTI): ICD-10-CM

## 2024-05-06 DIAGNOSIS — R60.0 LOWER EXTREMITY EDEMA: ICD-10-CM

## 2024-05-06 PROCEDURE — 1157F ADVNC CARE PLAN IN RCRD: CPT | Performed by: INTERNAL MEDICINE

## 2024-05-06 PROCEDURE — 36415 COLL VENOUS BLD VENIPUNCTURE: CPT

## 2024-05-06 PROCEDURE — 3077F SYST BP >= 140 MM HG: CPT | Performed by: INTERNAL MEDICINE

## 2024-05-06 PROCEDURE — 83880 ASSAY OF NATRIURETIC PEPTIDE: CPT

## 2024-05-06 PROCEDURE — 80053 COMPREHEN METABOLIC PANEL: CPT

## 2024-05-06 PROCEDURE — 1160F RVW MEDS BY RX/DR IN RCRD: CPT | Performed by: INTERNAL MEDICINE

## 2024-05-06 PROCEDURE — 1159F MED LIST DOCD IN RCRD: CPT | Performed by: INTERNAL MEDICINE

## 2024-05-06 PROCEDURE — 99213 OFFICE O/P EST LOW 20 MIN: CPT | Performed by: INTERNAL MEDICINE

## 2024-05-06 PROCEDURE — 3078F DIAST BP <80 MM HG: CPT | Performed by: INTERNAL MEDICINE

## 2024-05-06 RX ORDER — METOPROLOL SUCCINATE 50 MG/1
50 TABLET, EXTENDED RELEASE ORAL DAILY
Qty: 90 TABLET | Refills: 1 | Status: SHIPPED
Start: 2024-05-06 | End: 2024-05-08 | Stop reason: SINTOL

## 2024-05-06 ASSESSMENT — ENCOUNTER SYMPTOMS
PALPITATIONS: 0
CHEST TIGHTNESS: 0
SHORTNESS OF BREATH: 1
CHOKING: 0
CHILLS: 0
FEVER: 0
APPETITE CHANGE: 0
WHEEZING: 0
APNEA: 0
DIAPHORESIS: 0
COUGH: 1
FATIGUE: 0
UNEXPECTED WEIGHT CHANGE: 0
STRIDOR: 0
ACTIVITY CHANGE: 0

## 2024-05-06 NOTE — ASSESSMENT & PLAN NOTE
Pt has a large amount of LE edema today and feelings of leg tightness and PALOMARES  This may be secondary to Amlodipine use vs CHF  Will do labs today and stop Amlodipine for now (lungs clear on exam)  If BNP high and no improvements in LE swelling off Amlodipine will need echo   Will increase Metoprolol to 50 mg daily-new rx sent into pharmacy

## 2024-05-06 NOTE — PATIENT INSTRUCTIONS
Stop taking Amlodipine as I believe it is causing the swelling in your legs  Start Metoprolol at 50 mg dose-just one pill a day (this prescription is at [facility]-please go and pick this up)  Continue taking your BP at [facility] center or at home and record for me to see at your next visit  We did some blood work today and will call only if they are abnormal   Follow up in 3 weeks

## 2024-05-06 NOTE — PROGRESS NOTES
Dear Mr. Campos Yanet,    I wanted to follow up on your recent test results:    Good news, all of your tests are normal. HIV and hepatitis C are negative; you are immune to hepatitis B. Liver/kidney function and blood counts are all normal. Hopefully you were able to start the medications. Please follow up in 2 weeks for repeat blood work. Subjective   Patient ID: Nehal Walden is a 89 y.o. female who presents for Follow-up (1m), Shortness of Breath, and Leg Swelling.    Shortness of Breath  Associated symptoms include leg swelling. Pertinent negatives include no chest pain, fever or wheezing.       Pt here in follow up to HTN.  She tells me she did 1/2 tablet of Metoprolol for few days but saw her BP high.  She then increased to 1 full tablet daily and ran out 1 week ago.  Today she is only on 5 mg/day of Amlodipine.  She has a lot of swelling in her legs today and feels short of breath.  She tells me her BP continues to be high 150-160.      Review of Systems   Constitutional:  Negative for activity change, appetite change, chills, diaphoresis, fatigue, fever and unexpected weight change.   Respiratory:  Positive for cough and shortness of breath. Negative for apnea, choking, chest tightness, wheezing and stridor.    Cardiovascular:  Positive for leg swelling. Negative for chest pain and palpitations.       Objective   /70 (BP Location: Right arm, Patient Position: Sitting)   Pulse 76   Wt 75.4 kg (166 lb 4.8 oz)   BMI 30.42 kg/m²    Physical Exam  Constitutional:       Appearance: Normal appearance.   Cardiovascular:      Rate and Rhythm: Normal rate and regular rhythm.      Heart sounds: Normal heart sounds.   Pulmonary:      Effort: Pulmonary effort is normal.      Breath sounds: Normal breath sounds.   Musculoskeletal:      Right lower leg: Edema present.      Left lower leg: Edema present.   Neurological:      Mental Status: She is alert and oriented to person, place, and time.   Psychiatric:         Mood and Affect: Mood normal.         Assessment/Plan   Problem List Items Addressed This Visit       Benign essential hypertension - Primary     Pt has a large amount of LE edema today and feelings of leg tightness and PALOMARES  This may be secondary to Amlodipine use vs CHF  Will do labs today and stop Amlodipine for now (lungs clear on  exam)  If BNP high and no improvements in LE swelling off Amlodipine will need echo   Will increase Metoprolol to 50 mg daily-new rx sent into pharmacy          Relevant Medications    metoprolol succinate XL (Toprol-XL) 50 mg 24 hr tablet    Other Relevant Orders    Comprehensive Metabolic Panel    B-type natriuretic peptide    Follow Up In Primary Care - Established    Stage 3a chronic kidney disease (Multi)    Relevant Orders    B-type natriuretic peptide    Follow Up In Primary Care - Established     Other Visit Diagnoses       Lower extremity edema        Relevant Orders    Comprehensive Metabolic Panel    B-type natriuretic peptide    Follow Up In Primary Care - Established    SOB (shortness of breath) on exertion        Relevant Orders    B-type natriuretic peptide    Follow Up In Primary Care - Established

## 2024-05-07 LAB
ALBUMIN SERPL BCP-MCNC: 4.8 G/DL (ref 3.4–5)
ALP SERPL-CCNC: 72 U/L (ref 33–136)
ALT SERPL W P-5'-P-CCNC: 32 U/L (ref 7–45)
ANION GAP SERPL CALC-SCNC: 17 MMOL/L (ref 10–20)
AST SERPL W P-5'-P-CCNC: 26 U/L (ref 9–39)
BILIRUB SERPL-MCNC: 0.8 MG/DL (ref 0–1.2)
BNP SERPL-MCNC: 148 PG/ML (ref 0–99)
BUN SERPL-MCNC: 19 MG/DL (ref 6–23)
CALCIUM SERPL-MCNC: 9.5 MG/DL (ref 8.6–10.6)
CHLORIDE SERPL-SCNC: 100 MMOL/L (ref 98–107)
CO2 SERPL-SCNC: 22 MMOL/L (ref 21–32)
CREAT SERPL-MCNC: 1.14 MG/DL (ref 0.5–1.05)
EGFRCR SERPLBLD CKD-EPI 2021: 46 ML/MIN/1.73M*2
GLUCOSE SERPL-MCNC: 101 MG/DL (ref 74–99)
POTASSIUM SERPL-SCNC: 4.2 MMOL/L (ref 3.5–5.3)
PROT SERPL-MCNC: 7.3 G/DL (ref 6.4–8.2)
SODIUM SERPL-SCNC: 135 MMOL/L (ref 136–145)

## 2024-05-08 ENCOUNTER — TELEPHONE (OUTPATIENT)
Dept: PRIMARY CARE | Facility: CLINIC | Age: 89
End: 2024-05-08
Payer: COMMERCIAL

## 2024-05-08 DIAGNOSIS — I10 BENIGN ESSENTIAL HYPERTENSION: Primary | ICD-10-CM

## 2024-05-08 RX ORDER — HYDRALAZINE HYDROCHLORIDE 25 MG/1
25 TABLET, FILM COATED ORAL 2 TIMES DAILY
Qty: 180 TABLET | Refills: 0 | Status: SHIPPED | OUTPATIENT
Start: 2024-05-08 | End: 2024-08-06

## 2024-05-08 NOTE — TELEPHONE ENCOUNTER
Nehal called today saying she can't breathe, can't walk and can not urinate, says its from the Metoprolol  you increased. She is retaining too much water. She is swollen belly, and legs. Walking takes her breath away.      Please advise

## 2024-05-08 NOTE — TELEPHONE ENCOUNTER
Patient informed she will stop Metoprolol, did not take today. She will start new medication and made OV for next week. She said if she still feels like this she might got ED.

## 2024-05-08 NOTE — TELEPHONE ENCOUNTER
Ok stop use immediately and start new BP med-hydralazine 25 mg twice a day   Unfortunately she needs to be on something due to how high her BP is   Will send in new script to [facility] and need to see her in 1 week

## 2024-05-14 ENCOUNTER — OFFICE VISIT (OUTPATIENT)
Dept: PRIMARY CARE | Facility: CLINIC | Age: 89
End: 2024-05-14
Payer: MEDICARE

## 2024-05-14 VITALS
DIASTOLIC BLOOD PRESSURE: 66 MMHG | BODY MASS INDEX: 29.37 KG/M2 | SYSTOLIC BLOOD PRESSURE: 138 MMHG | HEART RATE: 64 BPM | WEIGHT: 160.6 LBS

## 2024-05-14 DIAGNOSIS — N18.31 STAGE 3A CHRONIC KIDNEY DISEASE (MULTI): ICD-10-CM

## 2024-05-14 DIAGNOSIS — I10 BENIGN ESSENTIAL HYPERTENSION: ICD-10-CM

## 2024-05-14 DIAGNOSIS — R39.11 URINARY HESITANCY: Primary | ICD-10-CM

## 2024-05-14 LAB
POC APPEARANCE, URINE: CLEAR
POC BILIRUBIN, URINE: NEGATIVE
POC BLOOD, URINE: NEGATIVE
POC COLOR, URINE: ABNORMAL
POC GLUCOSE, URINE: NEGATIVE MG/DL
POC KETONES, URINE: NEGATIVE MG/DL
POC LEUKOCYTES, URINE: ABNORMAL
POC NITRITE,URINE: NEGATIVE
POC PH, URINE: 6 PH
POC PROTEIN, URINE: NEGATIVE MG/DL
POC SPECIFIC GRAVITY, URINE: 1.01
POC UROBILINOGEN, URINE: 0.2 EU/DL

## 2024-05-14 PROCEDURE — 3075F SYST BP GE 130 - 139MM HG: CPT | Performed by: INTERNAL MEDICINE

## 2024-05-14 PROCEDURE — 99213 OFFICE O/P EST LOW 20 MIN: CPT | Performed by: INTERNAL MEDICINE

## 2024-05-14 PROCEDURE — 3078F DIAST BP <80 MM HG: CPT | Performed by: INTERNAL MEDICINE

## 2024-05-14 PROCEDURE — 81002 URINALYSIS NONAUTO W/O SCOPE: CPT | Performed by: INTERNAL MEDICINE

## 2024-05-14 PROCEDURE — 1157F ADVNC CARE PLAN IN RCRD: CPT | Performed by: INTERNAL MEDICINE

## 2024-05-14 PROCEDURE — 87086 URINE CULTURE/COLONY COUNT: CPT

## 2024-05-14 PROCEDURE — 1160F RVW MEDS BY RX/DR IN RCRD: CPT | Performed by: INTERNAL MEDICINE

## 2024-05-14 PROCEDURE — 1159F MED LIST DOCD IN RCRD: CPT | Performed by: INTERNAL MEDICINE

## 2024-05-14 ASSESSMENT — ENCOUNTER SYMPTOMS
WHEEZING: 0
FREQUENCY: 0
DIZZINESS: 0
SHORTNESS OF BREATH: 0
DYSURIA: 0
HEADACHES: 0
PALPITATIONS: 0
APPETITE CHANGE: 0
UNEXPECTED WEIGHT CHANGE: 0
COUGH: 0
CHILLS: 0
ACTIVITY CHANGE: 0
CHEST TIGHTNESS: 0
FATIGUE: 0
LIGHT-HEADEDNESS: 0
FEVER: 0
DIFFICULTY URINATING: 0
HEMATURIA: 0
FLANK PAIN: 0

## 2024-05-14 NOTE — ASSESSMENT & PLAN NOTE
BP is the best it has been   Pt was unable to tolerate Metoprolol as it threw her into heart failure-edema in legs resolving and breathing improved  Losartan caused URI/mucous like symptoms  Amlodipine caused LE edema at higher doses and the 5 mg wasn't enough  On Hydralazine 25 mg twice a day with good results-having some urinary symptoms but do not believe this is due to meds

## 2024-05-14 NOTE — PATIENT INSTRUCTIONS
Continue current medication as directed-twice a day  We will test the urine to make sure no infection noted  Push hydration-drink plenty of fluids and monitor urination issues  Continue to have your blood pressure checked  Follow up here in 3 months for full physical (we will do blood work again)

## 2024-05-15 LAB — BACTERIA UR CULT: NORMAL

## 2024-05-16 ENCOUNTER — TELEPHONE (OUTPATIENT)
Dept: PRIMARY CARE | Facility: CLINIC | Age: 89
End: 2024-05-16
Payer: COMMERCIAL

## 2024-05-16 NOTE — TELEPHONE ENCOUNTER
----- Message from Ana Laura ESTEVEZ DO sent at 5/16/2024  8:24 AM EDT -----  Urine was not infected; see if urinating any better

## 2024-05-20 ENCOUNTER — TELEPHONE (OUTPATIENT)
Dept: PRIMARY CARE | Facility: CLINIC | Age: 89
End: 2024-05-20
Payer: COMMERCIAL

## 2024-05-20 NOTE — TELEPHONE ENCOUNTER
"Pt has side effects from all the meds that were tried.  The medication does not cause fluid retention so there is no true \"water in the belly\" and it does not impact urination.  It can cause diarrhea and upset stomach but she hasn't mentioned that   She can try to take 1/2 tablet but needs to then take it 4 times a day and not 2 times so 12. 5 mg 4 times a day to get same results and perhaps tolerate it better  "

## 2024-05-20 NOTE — TELEPHONE ENCOUNTER
"Nehal said she can breath, her stomach is \"blowing up\" its from this medicine she states her BP  is 158/77,  she says \"Too much water in her belly\", can't even drink anymore, she hasn't been able to  urinate since this morning.       Please advise   "

## 2024-05-21 NOTE — TELEPHONE ENCOUNTER
Patient advised per Dr Agrawal. She will cut pills in half and understands she then will need to take 4 a day. She will try and see if this helps at all. Will let us know.

## 2024-05-30 ENCOUNTER — APPOINTMENT (OUTPATIENT)
Dept: PRIMARY CARE | Facility: CLINIC | Age: 89
End: 2024-05-30
Payer: MEDICARE

## 2024-06-11 ENCOUNTER — APPOINTMENT (OUTPATIENT)
Dept: PRIMARY CARE | Facility: CLINIC | Age: 89
End: 2024-06-11
Payer: COMMERCIAL

## 2024-06-14 ENCOUNTER — HOSPITAL ENCOUNTER (OUTPATIENT)
Dept: RADIOLOGY | Facility: CLINIC | Age: 89
Discharge: HOME | End: 2024-06-14
Payer: MEDICARE

## 2024-06-14 ENCOUNTER — APPOINTMENT (OUTPATIENT)
Dept: CARDIOLOGY | Facility: HOSPITAL | Age: 89
DRG: 439 | End: 2024-06-14
Payer: MEDICARE

## 2024-06-14 ENCOUNTER — APPOINTMENT (OUTPATIENT)
Dept: RADIOLOGY | Facility: HOSPITAL | Age: 89
DRG: 439 | End: 2024-06-14
Payer: MEDICARE

## 2024-06-14 ENCOUNTER — TELEPHONE (OUTPATIENT)
Dept: PRIMARY CARE | Facility: CLINIC | Age: 89
End: 2024-06-14

## 2024-06-14 ENCOUNTER — HOSPITAL ENCOUNTER (INPATIENT)
Facility: HOSPITAL | Age: 89
DRG: 439 | End: 2024-06-14
Attending: STUDENT IN AN ORGANIZED HEALTH CARE EDUCATION/TRAINING PROGRAM | Admitting: INTERNAL MEDICINE
Payer: MEDICARE

## 2024-06-14 ENCOUNTER — OFFICE VISIT (OUTPATIENT)
Dept: PRIMARY CARE | Facility: CLINIC | Age: 89
End: 2024-06-14
Payer: MEDICARE

## 2024-06-14 VITALS
DIASTOLIC BLOOD PRESSURE: 78 MMHG | SYSTOLIC BLOOD PRESSURE: 150 MMHG | HEART RATE: 68 BPM | WEIGHT: 148.9 LBS | BODY MASS INDEX: 27.23 KG/M2 | TEMPERATURE: 98 F

## 2024-06-14 DIAGNOSIS — R10.30 LOWER ABDOMINAL PAIN: Primary | ICD-10-CM

## 2024-06-14 DIAGNOSIS — R11.2 NAUSEA AND VOMITING, UNSPECIFIED VOMITING TYPE: ICD-10-CM

## 2024-06-14 DIAGNOSIS — E87.1 HYPONATREMIA: ICD-10-CM

## 2024-06-14 DIAGNOSIS — K85.90 ACUTE PANCREATITIS, UNSPECIFIED COMPLICATION STATUS, UNSPECIFIED PANCREATITIS TYPE (HHS-HCC): Primary | ICD-10-CM

## 2024-06-14 DIAGNOSIS — R10.30 LOWER ABDOMINAL PAIN: ICD-10-CM

## 2024-06-14 LAB
ALBUMIN SERPL BCP-MCNC: 4.2 G/DL (ref 3.4–5)
ALP SERPL-CCNC: 81 U/L (ref 33–136)
ALT SERPL W P-5'-P-CCNC: 15 U/L (ref 7–45)
ANION GAP SERPL CALC-SCNC: 14 MMOL/L (ref 10–20)
ANION GAP SERPL CALC-SCNC: 16 MMOL/L (ref 10–20)
AST SERPL W P-5'-P-CCNC: 18 U/L (ref 9–39)
BASOPHILS # BLD AUTO: 0.06 X10*3/UL (ref 0–0.1)
BASOPHILS # BLD AUTO: 0.06 X10*3/UL (ref 0–0.1)
BASOPHILS NFR BLD AUTO: 0.8 %
BASOPHILS NFR BLD AUTO: 1.3 %
BILIRUB SERPL-MCNC: 0.6 MG/DL (ref 0–1.2)
BUN SERPL-MCNC: 17 MG/DL (ref 6–23)
BUN SERPL-MCNC: 19 MG/DL (ref 6–23)
CALCIUM SERPL-MCNC: 9.2 MG/DL (ref 8.6–10.3)
CALCIUM SERPL-MCNC: 9.6 MG/DL (ref 8.6–10.6)
CHLORIDE SERPL-SCNC: 94 MMOL/L (ref 98–107)
CHLORIDE SERPL-SCNC: 96 MMOL/L (ref 98–107)
CO2 SERPL-SCNC: 23 MMOL/L (ref 21–32)
CO2 SERPL-SCNC: 24 MMOL/L (ref 21–32)
CREAT SERPL-MCNC: 1.08 MG/DL (ref 0.5–1.05)
CREAT SERPL-MCNC: 1.11 MG/DL (ref 0.5–1.05)
EGFRCR SERPLBLD CKD-EPI 2021: 48 ML/MIN/1.73M*2
EGFRCR SERPLBLD CKD-EPI 2021: 50 ML/MIN/1.73M*2
EOSINOPHIL # BLD AUTO: 0.44 X10*3/UL (ref 0–0.4)
EOSINOPHIL # BLD AUTO: 0.45 X10*3/UL (ref 0–0.4)
EOSINOPHIL NFR BLD AUTO: 5.6 %
EOSINOPHIL NFR BLD AUTO: 9.7 %
ERYTHROCYTE [DISTWIDTH] IN BLOOD BY AUTOMATED COUNT: 13.8 % (ref 11.5–14.5)
ERYTHROCYTE [DISTWIDTH] IN BLOOD BY AUTOMATED COUNT: 13.9 % (ref 11.5–14.5)
GLUCOSE SERPL-MCNC: 100 MG/DL (ref 74–99)
GLUCOSE SERPL-MCNC: 89 MG/DL (ref 74–99)
HCT VFR BLD AUTO: 35.3 % (ref 36–46)
HCT VFR BLD AUTO: 36 % (ref 36–46)
HGB BLD-MCNC: 11.7 G/DL (ref 12–16)
HGB BLD-MCNC: 11.8 G/DL (ref 12–16)
IMM GRANULOCYTES # BLD AUTO: 0.01 X10*3/UL (ref 0–0.5)
IMM GRANULOCYTES # BLD AUTO: 0.06 X10*3/UL (ref 0–0.5)
IMM GRANULOCYTES NFR BLD AUTO: 0.2 % (ref 0–0.9)
IMM GRANULOCYTES NFR BLD AUTO: 0.8 % (ref 0–0.9)
LIPASE SERPL-CCNC: 109 U/L (ref 9–82)
LYMPHOCYTES # BLD AUTO: 0.61 X10*3/UL (ref 0.8–3)
LYMPHOCYTES # BLD AUTO: 0.98 X10*3/UL (ref 0.8–3)
LYMPHOCYTES NFR BLD AUTO: 21.5 %
LYMPHOCYTES NFR BLD AUTO: 7.7 %
MCH RBC QN AUTO: 28.4 PG (ref 26–34)
MCH RBC QN AUTO: 28.9 PG (ref 26–34)
MCHC RBC AUTO-ENTMCNC: 32.8 G/DL (ref 32–36)
MCHC RBC AUTO-ENTMCNC: 33.1 G/DL (ref 32–36)
MCV RBC AUTO: 87 FL (ref 80–100)
MCV RBC AUTO: 87 FL (ref 80–100)
MONOCYTES # BLD AUTO: 0.8 X10*3/UL (ref 0.05–0.8)
MONOCYTES # BLD AUTO: 0.89 X10*3/UL (ref 0.05–0.8)
MONOCYTES NFR BLD AUTO: 11.2 %
MONOCYTES NFR BLD AUTO: 17.6 %
NEUTROPHILS # BLD AUTO: 2.26 X10*3/UL (ref 1.6–5.5)
NEUTROPHILS # BLD AUTO: 5.9 X10*3/UL (ref 1.6–5.5)
NEUTROPHILS NFR BLD AUTO: 49.7 %
NEUTROPHILS NFR BLD AUTO: 73.9 %
NRBC BLD-RTO: 0 /100 WBCS (ref 0–0)
NRBC BLD-RTO: 0 /100 WBCS (ref 0–0)
PLATELET # BLD AUTO: 253 X10*3/UL (ref 150–450)
PLATELET # BLD AUTO: 299 X10*3/UL (ref 150–450)
POTASSIUM SERPL-SCNC: 4.5 MMOL/L (ref 3.5–5.3)
POTASSIUM SERPL-SCNC: 4.7 MMOL/L (ref 3.5–5.3)
PROT SERPL-MCNC: 6.8 G/DL (ref 6.4–8.2)
RBC # BLD AUTO: 4.05 X10*6/UL (ref 4–5.2)
RBC # BLD AUTO: 4.16 X10*6/UL (ref 4–5.2)
SODIUM SERPL-SCNC: 128 MMOL/L (ref 136–145)
SODIUM SERPL-SCNC: 129 MMOL/L (ref 136–145)
WBC # BLD AUTO: 4.6 X10*3/UL (ref 4.4–11.3)
WBC # BLD AUTO: 8 X10*3/UL (ref 4.4–11.3)

## 2024-06-14 PROCEDURE — 1200000002 HC GENERAL ROOM WITH TELEMETRY DAILY

## 2024-06-14 PROCEDURE — 3077F SYST BP >= 140 MM HG: CPT | Performed by: INTERNAL MEDICINE

## 2024-06-14 PROCEDURE — 3078F DIAST BP <80 MM HG: CPT | Performed by: INTERNAL MEDICINE

## 2024-06-14 PROCEDURE — 36415 COLL VENOUS BLD VENIPUNCTURE: CPT | Performed by: STUDENT IN AN ORGANIZED HEALTH CARE EDUCATION/TRAINING PROGRAM

## 2024-06-14 PROCEDURE — 85025 COMPLETE CBC W/AUTO DIFF WBC: CPT

## 2024-06-14 PROCEDURE — 99214 OFFICE O/P EST MOD 30 MIN: CPT | Performed by: INTERNAL MEDICINE

## 2024-06-14 PROCEDURE — 76705 ECHO EXAM OF ABDOMEN: CPT

## 2024-06-14 PROCEDURE — 96361 HYDRATE IV INFUSION ADD-ON: CPT

## 2024-06-14 PROCEDURE — 2500000004 HC RX 250 GENERAL PHARMACY W/ HCPCS (ALT 636 FOR OP/ED): Performed by: STUDENT IN AN ORGANIZED HEALTH CARE EDUCATION/TRAINING PROGRAM

## 2024-06-14 PROCEDURE — 85025 COMPLETE CBC W/AUTO DIFF WBC: CPT | Performed by: STUDENT IN AN ORGANIZED HEALTH CARE EDUCATION/TRAINING PROGRAM

## 2024-06-14 PROCEDURE — 76705 ECHO EXAM OF ABDOMEN: CPT | Mod: FOREIGN READ | Performed by: RADIOLOGY

## 2024-06-14 PROCEDURE — 93005 ELECTROCARDIOGRAM TRACING: CPT

## 2024-06-14 PROCEDURE — 80048 BASIC METABOLIC PNL TOTAL CA: CPT | Performed by: STUDENT IN AN ORGANIZED HEALTH CARE EDUCATION/TRAINING PROGRAM

## 2024-06-14 PROCEDURE — 96374 THER/PROPH/DIAG INJ IV PUSH: CPT

## 2024-06-14 PROCEDURE — 2500000004 HC RX 250 GENERAL PHARMACY W/ HCPCS (ALT 636 FOR OP/ED)

## 2024-06-14 PROCEDURE — 1159F MED LIST DOCD IN RCRD: CPT | Performed by: INTERNAL MEDICINE

## 2024-06-14 PROCEDURE — 1157F ADVNC CARE PLAN IN RCRD: CPT | Performed by: INTERNAL MEDICINE

## 2024-06-14 PROCEDURE — 74176 CT ABD & PELVIS W/O CONTRAST: CPT

## 2024-06-14 PROCEDURE — 1160F RVW MEDS BY RX/DR IN RCRD: CPT | Performed by: INTERNAL MEDICINE

## 2024-06-14 PROCEDURE — 99285 EMERGENCY DEPT VISIT HI MDM: CPT

## 2024-06-14 RX ORDER — ONDANSETRON 4 MG/1
4 TABLET, FILM COATED ORAL EVERY 8 HOURS PRN
Qty: 20 TABLET | Refills: 0 | Status: ON HOLD | OUTPATIENT
Start: 2024-06-14 | End: 2024-06-21

## 2024-06-14 RX ORDER — ACETAMINOPHEN 325 MG/1
650 TABLET ORAL EVERY 4 HOURS PRN
Status: DISCONTINUED | OUTPATIENT
Start: 2024-06-14 | End: 2024-06-18 | Stop reason: HOSPADM

## 2024-06-14 RX ORDER — MORPHINE SULFATE 4 MG/ML
4 INJECTION, SOLUTION INTRAMUSCULAR; INTRAVENOUS EVERY 4 HOURS PRN
Status: DISCONTINUED | OUTPATIENT
Start: 2024-06-14 | End: 2024-06-18 | Stop reason: HOSPADM

## 2024-06-14 RX ORDER — MORPHINE SULFATE 2 MG/ML
2 INJECTION, SOLUTION INTRAMUSCULAR; INTRAVENOUS EVERY 4 HOURS PRN
Status: DISCONTINUED | OUTPATIENT
Start: 2024-06-14 | End: 2024-06-18 | Stop reason: HOSPADM

## 2024-06-14 RX ORDER — ACETAMINOPHEN 160 MG/5ML
650 SOLUTION ORAL EVERY 4 HOURS PRN
Status: DISCONTINUED | OUTPATIENT
Start: 2024-06-14 | End: 2024-06-18 | Stop reason: HOSPADM

## 2024-06-14 RX ORDER — ACETAMINOPHEN 650 MG/1
650 SUPPOSITORY RECTAL EVERY 4 HOURS PRN
Status: DISCONTINUED | OUTPATIENT
Start: 2024-06-14 | End: 2024-06-18 | Stop reason: HOSPADM

## 2024-06-14 RX ORDER — POLYETHYLENE GLYCOL 3350 17 G/17G
17 POWDER, FOR SOLUTION ORAL DAILY
Status: DISCONTINUED | OUTPATIENT
Start: 2024-06-15 | End: 2024-06-18 | Stop reason: HOSPADM

## 2024-06-14 RX ORDER — ENOXAPARIN SODIUM 100 MG/ML
40 INJECTION SUBCUTANEOUS EVERY 24 HOURS
Status: DISCONTINUED | OUTPATIENT
Start: 2024-06-14 | End: 2024-06-18 | Stop reason: HOSPADM

## 2024-06-14 RX ORDER — ONDANSETRON HYDROCHLORIDE 2 MG/ML
4 INJECTION, SOLUTION INTRAVENOUS EVERY 4 HOURS PRN
Status: DISCONTINUED | OUTPATIENT
Start: 2024-06-14 | End: 2024-06-18 | Stop reason: HOSPADM

## 2024-06-14 RX ORDER — ONDANSETRON 4 MG/1
4 TABLET, FILM COATED ORAL EVERY 8 HOURS PRN
Status: DISCONTINUED | OUTPATIENT
Start: 2024-06-14 | End: 2024-06-18 | Stop reason: HOSPADM

## 2024-06-14 RX ORDER — ONDANSETRON HYDROCHLORIDE 2 MG/ML
4 INJECTION, SOLUTION INTRAVENOUS EVERY 8 HOURS PRN
Status: DISCONTINUED | OUTPATIENT
Start: 2024-06-14 | End: 2024-06-18 | Stop reason: HOSPADM

## 2024-06-14 RX ORDER — DEXTROSE MONOHYDRATE, SODIUM CHLORIDE, AND POTASSIUM CHLORIDE 50; 1.49; 9 G/1000ML; G/1000ML; G/1000ML
100 INJECTION, SOLUTION INTRAVENOUS CONTINUOUS
Status: ACTIVE | OUTPATIENT
Start: 2024-06-14 | End: 2024-06-15

## 2024-06-14 RX ADMIN — ONDANSETRON 4 MG: 2 INJECTION INTRAMUSCULAR; INTRAVENOUS at 18:54

## 2024-06-14 RX ADMIN — ENOXAPARIN SODIUM 40 MG: 40 INJECTION SUBCUTANEOUS at 21:19

## 2024-06-14 RX ADMIN — SODIUM CHLORIDE, POTASSIUM CHLORIDE, SODIUM LACTATE AND CALCIUM CHLORIDE 1000 ML: 600; 310; 30; 20 INJECTION, SOLUTION INTRAVENOUS at 18:55

## 2024-06-14 RX ADMIN — POTASSIUM CHLORIDE, DEXTROSE MONOHYDRATE AND SODIUM CHLORIDE 100 ML/HR: 150; 5; 900 INJECTION, SOLUTION INTRAVENOUS at 21:20

## 2024-06-14 SDOH — SOCIAL STABILITY: SOCIAL INSECURITY: DO YOU FEEL UNSAFE GOING BACK TO THE PLACE WHERE YOU ARE LIVING?: NO

## 2024-06-14 SDOH — SOCIAL STABILITY: SOCIAL INSECURITY: DO YOU FEEL ANYONE HAS EXPLOITED OR TAKEN ADVANTAGE OF YOU FINANCIALLY OR OF YOUR PERSONAL PROPERTY?: NO

## 2024-06-14 SDOH — SOCIAL STABILITY: SOCIAL INSECURITY: HAS ANYONE EVER THREATENED TO HURT YOUR FAMILY OR YOUR PETS?: NO

## 2024-06-14 SDOH — SOCIAL STABILITY: SOCIAL INSECURITY: HAVE YOU HAD ANY THOUGHTS OF HARMING ANYONE ELSE?: NO

## 2024-06-14 SDOH — SOCIAL STABILITY: SOCIAL INSECURITY: ARE YOU OR HAVE YOU BEEN THREATENED OR ABUSED PHYSICALLY, EMOTIONALLY, OR SEXUALLY BY ANYONE?: NO

## 2024-06-14 SDOH — SOCIAL STABILITY: SOCIAL INSECURITY: ABUSE: ADULT

## 2024-06-14 SDOH — SOCIAL STABILITY: SOCIAL INSECURITY: DOES ANYONE TRY TO KEEP YOU FROM HAVING/CONTACTING OTHER FRIENDS OR DOING THINGS OUTSIDE YOUR HOME?: NO

## 2024-06-14 SDOH — SOCIAL STABILITY: SOCIAL INSECURITY: WERE YOU ABLE TO COMPLETE ALL THE BEHAVIORAL HEALTH SCREENINGS?: YES

## 2024-06-14 SDOH — SOCIAL STABILITY: SOCIAL INSECURITY: HAVE YOU HAD THOUGHTS OF HARMING ANYONE ELSE?: NO

## 2024-06-14 ASSESSMENT — ENCOUNTER SYMPTOMS
SHORTNESS OF BREATH: 0
DIZZINESS: 1
RECTAL PAIN: 0
DYSURIA: 0
WEAKNESS: 1
DIARRHEA: 1
CONSTIPATION: 0
DIAPHORESIS: 0
ANAL BLEEDING: 0
ACTIVITY CHANGE: 0
VOMITING: 1
HEMATURIA: 0
CHEST TIGHTNESS: 0
FEVER: 0
ABDOMINAL PAIN: 1
CHILLS: 0
FATIGUE: 1
NAUSEA: 1
BLOOD IN STOOL: 0
DIFFICULTY URINATING: 0
ABDOMINAL DISTENTION: 0
FREQUENCY: 0
UNEXPECTED WEIGHT CHANGE: 0
COUGH: 0
APPETITE CHANGE: 1

## 2024-06-14 ASSESSMENT — PAIN DESCRIPTION - ONSET: ONSET: ONGOING

## 2024-06-14 ASSESSMENT — COGNITIVE AND FUNCTIONAL STATUS - GENERAL
MOBILITY SCORE: 24
PATIENT BASELINE BEDBOUND: NO
DAILY ACTIVITIY SCORE: 24

## 2024-06-14 ASSESSMENT — PAIN SCALES - GENERAL
PAINLEVEL_OUTOF10: 8
PAINLEVEL_OUTOF10: 0 - NO PAIN

## 2024-06-14 ASSESSMENT — LIFESTYLE VARIABLES
EVER FELT BAD OR GUILTY ABOUT YOUR DRINKING: NO
AUDIT-C TOTAL SCORE: 0
HOW OFTEN DO YOU HAVE 6 OR MORE DRINKS ON ONE OCCASION: NEVER
AUDIT-C TOTAL SCORE: 0
SKIP TO QUESTIONS 9-10: 1
EVER HAD A DRINK FIRST THING IN THE MORNING TO STEADY YOUR NERVES TO GET RID OF A HANGOVER: NO
HOW OFTEN DO YOU HAVE A DRINK CONTAINING ALCOHOL: NEVER
HOW MANY STANDARD DRINKS CONTAINING ALCOHOL DO YOU HAVE ON A TYPICAL DAY: PATIENT DOES NOT DRINK
HAVE PEOPLE ANNOYED YOU BY CRITICIZING YOUR DRINKING: NO
HAVE YOU EVER FELT YOU SHOULD CUT DOWN ON YOUR DRINKING: NO
TOTAL SCORE: 0

## 2024-06-14 ASSESSMENT — PAIN SCALES - WONG BAKER
WONGBAKER_NUMERICALRESPONSE: NO HURT
WONGBAKER_NUMERICALRESPONSE: HURTS WHOLE LOT

## 2024-06-14 ASSESSMENT — PAIN DESCRIPTION - DESCRIPTORS
DESCRIPTORS: ACHING;TENDER
DESCRIPTORS: ACHING

## 2024-06-14 ASSESSMENT — PATIENT HEALTH QUESTIONNAIRE - PHQ9
SUM OF ALL RESPONSES TO PHQ9 QUESTIONS 1 & 2: 0
2. FEELING DOWN, DEPRESSED OR HOPELESS: NOT AT ALL
1. LITTLE INTEREST OR PLEASURE IN DOING THINGS: NOT AT ALL

## 2024-06-14 ASSESSMENT — ACTIVITIES OF DAILY LIVING (ADL)
WALKS IN HOME: INDEPENDENT
DRESSING YOURSELF: INDEPENDENT
TOILETING: INDEPENDENT
FEEDING YOURSELF: INDEPENDENT
BATHING: INDEPENDENT
ADEQUATE_TO_COMPLETE_ADL: YES
GROOMING: INDEPENDENT
LACK_OF_TRANSPORTATION: PATIENT DECLINED
HEARING - LEFT EAR: FUNCTIONAL
PATIENT'S MEMORY ADEQUATE TO SAFELY COMPLETE DAILY ACTIVITIES?: YES
HEARING - RIGHT EAR: FUNCTIONAL
JUDGMENT_ADEQUATE_SAFELY_COMPLETE_DAILY_ACTIVITIES: YES
EFFECT OF PAIN ON DAILY ACTIVITIES: DECREASE

## 2024-06-14 ASSESSMENT — PAIN DESCRIPTION - ORIENTATION: ORIENTATION: LOWER;MID

## 2024-06-14 ASSESSMENT — PAIN - FUNCTIONAL ASSESSMENT: PAIN_FUNCTIONAL_ASSESSMENT: WONG-BAKER FACES

## 2024-06-14 ASSESSMENT — COLUMBIA-SUICIDE SEVERITY RATING SCALE - C-SSRS
1. IN THE PAST MONTH, HAVE YOU WISHED YOU WERE DEAD OR WISHED YOU COULD GO TO SLEEP AND NOT WAKE UP?: NO
2. HAVE YOU ACTUALLY HAD ANY THOUGHTS OF KILLING YOURSELF?: NO
6. HAVE YOU EVER DONE ANYTHING, STARTED TO DO ANYTHING, OR PREPARED TO DO ANYTHING TO END YOUR LIFE?: NO

## 2024-06-14 ASSESSMENT — PAIN DESCRIPTION - FREQUENCY: FREQUENCY: CONSTANT/CONTINUOUS

## 2024-06-14 ASSESSMENT — PAIN DESCRIPTION - LOCATION: LOCATION: ABDOMEN

## 2024-06-14 ASSESSMENT — PAIN DESCRIPTION - PAIN TYPE: TYPE: ACUTE PAIN

## 2024-06-14 ASSESSMENT — PAIN DESCRIPTION - PROGRESSION: CLINICAL_PROGRESSION: GRADUALLY WORSENING

## 2024-06-14 NOTE — ED PROVIDER NOTES
HPI   Chief Complaint   Patient presents with    Abdominal Pain     Pt [place] fire [facility] via EMS, pt reports being at [facility] for abd pn and N/V, was supposed to be admitted but pt refused, pt received abd CT today, provider told pt to go to ER for pancreatitis. Pt c/o epigastric abd pn w/ N/V and diarrhea x4 weeks approx    Nausea    Vomiting       HPI       Patient is an 89-year-old female with past medical history significant for HTN, breast cancer s/p left mastectomy, CKD G3a, who presents to [facility] ED on 6/14 with chief complaint of abdominal pain in the setting of outpatient CTAP showing evidence of pancreatitis.  Briefly, patient was seen at [facility] ED on 6/8 for abdominal pain, nausea, vomiting however refused admission and left AMA.  Patient then reached out to her PCP early this morning to evaluate her and ordered outpatient CTAP.  On evaluation today she is still complaining of nausea and vomiting with bandlike abdominal pain that is not reproducible on palpation.  She says that she has not been able to keep hardly anything down since onset of her symptoms which was roughly a few days prior to evaluation at [facility].  She endorses associated SOB, generalized weakness, feeling hot/cold on and off and generalized anxiety.             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  Social History     Tobacco Use    Smoking status: Never    Smokeless tobacco: Never   Vaping Use    Vaping status: Never Used   Substance Use Topics    Alcohol use: Yes     Comment: rarely drinks wine    Drug use: Never       Physical Exam   ED Triage Vitals [06/14/24 1800]   Temperature Heart Rate Respirations BP   37.3 °C (99.1 °F) 72 18 155/81      Pulse Ox Temp Source Heart Rate Source Patient Position   99 % Temporal Monitor Sitting      BP Location FiO2 (%)     Right arm --       Physical Exam  Constitutional:       General: She is in acute distress.      Appearance: Normal appearance. She is ill-appearing.   HENT:      Head: Normocephalic and atraumatic.      Right Ear: External ear normal.      Left Ear: External ear normal.      Nose: Nose normal.   Eyes:      General: No scleral icterus.     Extraocular Movements: Extraocular movements intact.   Cardiovascular:      Rate and Rhythm: Normal rate and regular rhythm.      Pulses: Normal pulses.      Heart sounds: Normal heart sounds. No murmur heard.     No friction rub. No gallop.   Pulmonary:      Breath sounds: Normal breath sounds. No wheezing, rhonchi or rales.   Abdominal:      General: Bowel sounds are normal. There is distension (Chronic habitus?).      Palpations: Abdomen is soft.      Tenderness: There is no abdominal tenderness. There is no rebound.   Musculoskeletal:         General: No swelling. Normal range of motion.      Right lower leg: No edema.      Left lower leg: No edema.   Skin:     General: Skin is warm and dry.   Neurological:      General: No focal deficit present.      Mental Status: She is alert and oriented to person, place, and time. Mental status is at baseline.   Psychiatric:         Mood and Affect: Mood is anxious.         Behavior: Behavior normal.          ED Course & MDM   ED Course as of 06/15/24 0021 Fri Jun 14, 2024 1918 Interpreted by the Emergency Department Attending: ECG revealed normal sinus rhythm first-degree AV block at a rate of 65 beats per minute with SD interval 262 , QRS of 82 , QTc of 418.  No acute injury pattern. Previous EKG on January 22 revealed no significant changes.    [MG]   1940 SODIUM(!): 128 [KD]   1941 CHLORIDE(!): 96  Hypochloremic hyponatremia likely due to poor oral intake as evidenced by her nausea/vomiting of 2-week duration.  Currently being repleted with fluids [KD]   1942 RUQ US showing evidence of cholelithiasis without gallbladder wall thickening and without common bile duct dilatation [KD]   1942 Spoke to on-call hospitalist about patient and is agreeable to admission to general medicine floor [KD]      ED Course User Index  [KD] Frank Macedo DO  [MG] Dong Sarabia DO         Diagnoses as of 06/15/24 0021   Acute pancreatitis, unspecified complication status, unspecified pancreatitis type ([facility])       Medical Decision Making  Patient is an 89-year-old female with past medical history significant for HTN, breast cancer s/p left mastectomy, CKD G3a, who presents to [facility] ED on 6/14 with chief complaint of abdominal pain in the setting of outpatient CTAP showing evidence of pancreatitis.     Ordering routine lab work CBC, CMP.  Abdominal pain with CT findings alone confirm diagnosis of pancreatitis however will add on lipase for completion.  She denies history of gallstones, alcohol use, recent trauma, chronic steroid use, hyperlipidemia/hypertriglyceridemia, recent procedures, or introduction of new medications.  Moderate dilation of common bile duct of 11 mm would suggest possible gallstone pancreatitis.  Confirming with right upper quadrant ultrasound.  Treating symptomatically with Zofran.  1 L LR bolus administered.    Frank Macedo DO  PGY-1, Internal Medicine  Please SecureChat for any further  questions  This is a preliminary note, please await attending attestation for final A/P       Frank Macedo DO  Resident  06/14/24 1940      I did evaluate the patient independently from the resident and was present for key medical decision making.  Agree with disposition.  Any discrepancies between residents findings are detailed below.    In short this is an 89-year-old female presenting to the emergency department via EMS for diffuse abdominal tenderness mostly localized to the bilateral upper quadrants.  She was recently evaluated at [facility] emergency department and subsequently left AMA as their plan was to admit her to the hospital.  She recently saw her primary physician who had a thorough discussion with myself prior to arrival had concern for pancreatitis although lipase levels were pending.  Patient endorses nausea with emesis nonbilious nonbloody still having regular bowel movements and no measured fevers at home.  She denies any previous abdominal surgeries or history of pancreatitis or heavy alcohol usage.    VS: As documented in the triage note from today's date and EMR flowsheet were reviewed.  Gen: Well developed. No acute distress. Seated in bed. Appears nontoxic.   Skin: Warm. Dry. Intact. No rashes or lesions.  Eyes: Pupils equally round and reactive to light. Clear sclera.   HENT: Atraumatic appearance. Mucosal membranes moist. No oral lesions, uvula midline, airway patent.   CV: Regular rate and regular rhythm. S1, S2. No pedal edema. Warm extremities.  Resp: Nonlabored breathing Clear to auscultation bilaterally. No increased work of breathing.   GI: Soft and mild tenderness bilateral upper quadrants no acute abdomen Navarro sign McBurney's point tenderness is negative Portillo [place] sign is negative as well. No rebound or guarding. Bowel sounds x4 present.   MSK: Symmetric muscle bulk. No joint swelling in the extremities. Compartments are soft. Neurovascularly intact x4 extremities. Radial  pulses +2 equal bilaterally.  Pedal pulses +2 equal bilaterally.  Neuro: Alert. Speech fluent. Moving all extremities. No focal deficits. Gait normal.  Psych: Appropriate. Kempt.    Patient arrives hypertensive recommend follow-up with primary physician for repeat checks.  Lab work does show normal LFTs renal function is at baseline she does have slightly worsening hyponatremia for which will need further workup.  Her anemia is at baseline.  Right upper quadrant ultrasound was pursued showing no acute gallbladder pathology.  The resident did speak with the admitting physician who is agreed to accept the patient for symptomatic control at this time.  She was hydrated for suspected acute pancreatitis.  Admitting physician did take over care at time of admission order.  Patient was appreciative of care agreeable with plan.  Admitted in stable condition.    DO Dong Salmeron DO  06/15/24 0024

## 2024-06-14 NOTE — TELEPHONE ENCOUNTER
"Patient called today saying \"I do not feel good\", I don't want to live like this\" She is vomiting can't keep anything down, it was difficult to get answers from her, She did go to [facility] ED June 8th they wanted to admit her and she said NO, she told me that she stopped all her med's yesterday.  She just kept repeating to me I don't feel well. She is sick to her stomach and she can't eat, when asked if she is able to drink she didn't answer and repeated I don't feel good.   "

## 2024-06-14 NOTE — TELEPHONE ENCOUNTER
Have her come in at noon  Ideally would like her to take her BP meds so I can make sure her BP is good on them

## 2024-06-14 NOTE — PROGRESS NOTES
Subjective   Patient ID: Nehal Walden is a 89 y.o. female who presents for Nausea and Generalized Body Aches.    HPI  Pt here in acute visit. She is not feeling well.  She is sick to her stomach often and is throwing up overnight.  She is very upset today-she tells me she takes the BP meds and they make her sick.  If she doesn't take it she is sick.  She wakes up and feels like she is going to throw up throughout the day.  She has had this for two weeks now.  She tells me if she drinks fluids she is throwing up.  She has loose stools almost like diarrhea but not quite.  She has pain in abdomen but no one particular place.    She went to ER at [facility] on 6/8 and was sick already prior to this.  She had normal xray of chest and labs that only showed low sodium.  Troponin was negative as was CBC-white count.    She has stopped her BP med the last day due to feeling sick.      Review of Systems   Constitutional:  Positive for appetite change and fatigue. Negative for activity change, chills, diaphoresis, fever and unexpected weight change.   Respiratory:  Negative for cough, chest tightness and shortness of breath.    Cardiovascular:  Negative for chest pain.   Gastrointestinal:  Positive for abdominal pain, diarrhea, nausea and vomiting. Negative for abdominal distention, anal bleeding, blood in stool, constipation and rectal pain.   Genitourinary:  Negative for difficulty urinating, dysuria, frequency, hematuria, pelvic pain and urgency.   Neurological:  Positive for dizziness and weakness.       Objective   /78 (BP Location: Right arm, Patient Position: Sitting)   Pulse 68   Temp 36.7 °C (98 °F) (Oral)   Wt 67.5 kg (148 lb 14.4 oz)   BMI 27.23 kg/m²    Physical Exam  Constitutional:       Appearance: She is ill-appearing.   Cardiovascular:      Rate and Rhythm: Normal rate and regular rhythm.      Heart sounds: Normal heart sounds.   Pulmonary:      Effort: Pulmonary effort is normal.      Breath sounds:  Normal breath sounds.   Abdominal:      General: Abdomen is flat. Bowel sounds are normal. There is no distension.      Palpations: Abdomen is soft. There is no mass.      Tenderness: There is no abdominal tenderness. There is no right CVA tenderness, left CVA tenderness, guarding or rebound.      Hernia: No hernia is present.   Musculoskeletal:      Right lower leg: No edema.      Left lower leg: No edema.   Lymphadenopathy:      Cervical: No cervical adenopathy.   Neurological:      Mental Status: She is alert and oriented to person, place, and time.   Psychiatric:      Comments: Pt tearful due to not feeling well          Assessment/Plan   Problem List Items Addressed This Visit    None  Visit Diagnoses       Lower abdominal pain    -  Primary    Relevant Orders    CT abdomen pelvis wo IV contrast    CBC and Auto Differential    Nausea and vomiting, unspecified vomiting type        Relevant Medications    ondansetron (Zofran) 4 mg tablet    Other Relevant Orders    CT abdomen pelvis wo IV contrast    Basic Metabolic Panel    CBC and Auto Differential    Hyponatremia        Relevant Orders    Basic Metabolic Panel    CBC and Auto Differential

## 2024-06-14 NOTE — ED TRIAGE NOTES
Pt [place] fire station via EMS, pt reports being at [facility] for abd pn and N/V, was supposed to be admitted but pt refused, pt received abd CT today, provider told pt to go to ER for pancreatitis. Pt c/o epigastric abd pn w/ N/V and diarrhea x4 weeks approx

## 2024-06-14 NOTE — PATIENT INSTRUCTIONS
Lets get imaging of your abdomen and see if we notice anything as cause of symptoms  I will send into the pharmacy medication to help lessen nausea-you can take this only as needed  For now just try to take in fluids to stay hydrated; bland diet   Rest, push fluids, do not go to rec center for right now as this will just make you worse   Follow up based on results

## 2024-06-14 NOTE — ED NOTES
Report given to [facility]. No significant detrimental changes prior to handoff. Neuro/skin/respiratory grossly WDL.     Julio Oliver RN  06/14/24 1916

## 2024-06-15 LAB
ANION GAP SERPL CALC-SCNC: 8 MMOL/L (ref 10–20)
BUN SERPL-MCNC: 11 MG/DL (ref 6–23)
CALCIUM SERPL-MCNC: 7.2 MG/DL (ref 8.6–10.3)
CHLORIDE SERPL-SCNC: 111 MMOL/L (ref 98–107)
CO2 SERPL-SCNC: 21 MMOL/L (ref 21–32)
CREAT SERPL-MCNC: 0.9 MG/DL (ref 0.5–1.05)
EGFRCR SERPLBLD CKD-EPI 2021: 62 ML/MIN/1.73M*2
ERYTHROCYTE [DISTWIDTH] IN BLOOD BY AUTOMATED COUNT: 14.2 % (ref 11.5–14.5)
GLUCOSE BLD MANUAL STRIP-MCNC: 100 MG/DL (ref 74–99)
GLUCOSE BLD MANUAL STRIP-MCNC: 51 MG/DL (ref 74–99)
GLUCOSE BLD MANUAL STRIP-MCNC: 76 MG/DL (ref 74–99)
GLUCOSE BLD MANUAL STRIP-MCNC: 93 MG/DL (ref 74–99)
GLUCOSE SERPL-MCNC: 571 MG/DL (ref 74–99)
HCT VFR BLD AUTO: 27.4 % (ref 36–46)
HGB BLD-MCNC: 9.6 G/DL (ref 12–16)
LIPASE SERPL-CCNC: 119 U/L (ref 9–82)
MCH RBC QN AUTO: 30.5 PG (ref 26–34)
MCHC RBC AUTO-ENTMCNC: 35 G/DL (ref 32–36)
MCV RBC AUTO: 87 FL (ref 80–100)
NRBC BLD-RTO: 0 /100 WBCS (ref 0–0)
PLATELET # BLD AUTO: 231 X10*3/UL (ref 150–450)
POTASSIUM SERPL-SCNC: 6.7 MMOL/L (ref 3.5–5.3)
RBC # BLD AUTO: 3.15 X10*6/UL (ref 4–5.2)
SODIUM SERPL-SCNC: 133 MMOL/L (ref 136–145)
WBC # BLD AUTO: 3.4 X10*3/UL (ref 4.4–11.3)

## 2024-06-15 PROCEDURE — 2500000005 HC RX 250 GENERAL PHARMACY W/O HCPCS: Performed by: INTERNAL MEDICINE

## 2024-06-15 PROCEDURE — 97161 PT EVAL LOW COMPLEX 20 MIN: CPT | Mod: GP

## 2024-06-15 PROCEDURE — 2500000004 HC RX 250 GENERAL PHARMACY W/ HCPCS (ALT 636 FOR OP/ED)

## 2024-06-15 PROCEDURE — 36415 COLL VENOUS BLD VENIPUNCTURE: CPT

## 2024-06-15 PROCEDURE — 82947 ASSAY GLUCOSE BLOOD QUANT: CPT | Mod: 91

## 2024-06-15 PROCEDURE — 99221 1ST HOSP IP/OBS SF/LOW 40: CPT | Performed by: NURSE PRACTITIONER

## 2024-06-15 PROCEDURE — 82374 ASSAY BLOOD CARBON DIOXIDE: CPT

## 2024-06-15 PROCEDURE — 2500000002 HC RX 250 W HCPCS SELF ADMINISTERED DRUGS (ALT 637 FOR MEDICARE OP, ALT 636 FOR OP/ED): Performed by: INTERNAL MEDICINE

## 2024-06-15 PROCEDURE — 2500000001 HC RX 250 WO HCPCS SELF ADMINISTERED DRUGS (ALT 637 FOR MEDICARE OP): Performed by: INTERNAL MEDICINE

## 2024-06-15 PROCEDURE — 85027 COMPLETE CBC AUTOMATED: CPT

## 2024-06-15 PROCEDURE — 1200000002 HC GENERAL ROOM WITH TELEMETRY DAILY

## 2024-06-15 PROCEDURE — 2580000001 HC RX 258 IV SOLUTIONS: Performed by: INTERNAL MEDICINE

## 2024-06-15 PROCEDURE — 83690 ASSAY OF LIPASE: CPT | Performed by: STUDENT IN AN ORGANIZED HEALTH CARE EDUCATION/TRAINING PROGRAM

## 2024-06-15 RX ORDER — HYDRALAZINE HYDROCHLORIDE 20 MG/ML
10 INJECTION INTRAMUSCULAR; INTRAVENOUS ONCE
Status: COMPLETED | OUTPATIENT
Start: 2024-06-15 | End: 2024-06-15

## 2024-06-15 RX ORDER — DEXTROSE 50 % IN WATER (D50W) INTRAVENOUS SYRINGE
25
Status: DISCONTINUED | OUTPATIENT
Start: 2024-06-15 | End: 2024-06-18 | Stop reason: HOSPADM

## 2024-06-15 RX ORDER — DEXTROSE 50 % IN WATER (D50W) INTRAVENOUS SYRINGE
12.5 ONCE
Status: COMPLETED | OUTPATIENT
Start: 2024-06-15 | End: 2024-06-15

## 2024-06-15 RX ORDER — SODIUM POLYSTYRENE SULFONATE 15 G/60ML
15 SUSPENSION ORAL; RECTAL ONCE
Status: COMPLETED | OUTPATIENT
Start: 2024-06-15 | End: 2024-06-15

## 2024-06-15 RX ORDER — DEXTROSE MONOHYDRATE AND SODIUM CHLORIDE 5; .45 G/100ML; G/100ML
80 INJECTION, SOLUTION INTRAVENOUS CONTINUOUS
Status: DISCONTINUED | OUTPATIENT
Start: 2024-06-15 | End: 2024-06-15

## 2024-06-15 RX ADMIN — SODIUM POLYSTYRENE SULFONATE 15 G: 15 SUSPENSION ORAL; RECTAL at 10:35

## 2024-06-15 RX ADMIN — SODIUM CHLORIDE 1000 ML: 4.5 INJECTION, SOLUTION INTRAVENOUS at 11:05

## 2024-06-15 RX ADMIN — SODIUM CHLORIDE 1000 ML: 4.5 INJECTION, SOLUTION INTRAVENOUS at 23:09

## 2024-06-15 RX ADMIN — HYDRALAZINE HYDROCHLORIDE 10 MG: 20 INJECTION INTRAMUSCULAR; INTRAVENOUS at 21:38

## 2024-06-15 RX ADMIN — ENOXAPARIN SODIUM 40 MG: 40 INJECTION SUBCUTANEOUS at 21:38

## 2024-06-15 RX ADMIN — DEXTROSE MONOHYDRATE 12.5 G: 25 INJECTION, SOLUTION INTRAVENOUS at 17:06

## 2024-06-15 RX ADMIN — INSULIN HUMAN 10 UNITS: 100 INJECTION, SOLUTION PARENTERAL at 10:36

## 2024-06-15 ASSESSMENT — COGNITIVE AND FUNCTIONAL STATUS - GENERAL
STANDING UP FROM CHAIR USING ARMS: A LITTLE
MOBILITY SCORE: 17
TURNING FROM BACK TO SIDE WHILE IN FLAT BAD: A LITTLE
WALKING IN HOSPITAL ROOM: A LITTLE
DAILY ACTIVITIY SCORE: 24
MOVING TO AND FROM BED TO CHAIR: A LITTLE
MOVING FROM LYING ON BACK TO SITTING ON SIDE OF FLAT BED WITH BEDRAILS: A LITTLE
CLIMB 3 TO 5 STEPS WITH RAILING: A LOT
STANDING UP FROM CHAIR USING ARMS: A LITTLE
TURNING FROM BACK TO SIDE WHILE IN FLAT BAD: A LITTLE
DAILY ACTIVITIY SCORE: 24
WALKING IN HOSPITAL ROOM: A LITTLE
MOBILITY SCORE: 17
MOVING TO AND FROM BED TO CHAIR: A LITTLE
MOBILITY SCORE: 24
MOVING FROM LYING ON BACK TO SITTING ON SIDE OF FLAT BED WITH BEDRAILS: A LITTLE
CLIMB 3 TO 5 STEPS WITH RAILING: A LOT

## 2024-06-15 ASSESSMENT — PAIN SCALES - GENERAL
PAINLEVEL_OUTOF10: 0 - NO PAIN
PAINLEVEL_OUTOF10: 0 - NO PAIN

## 2024-06-15 ASSESSMENT — PAIN - FUNCTIONAL ASSESSMENT: PAIN_FUNCTIONAL_ASSESSMENT: 0-10

## 2024-06-15 NOTE — CONSULTS
Reason For Consult  Acute pancreatitis    This note was created using voice recognition transcription software. Despite proofreading, unintentional typographical errors may be present. Please contact the GI office with any questions or concerns.       This is an 89 yo Female with a PMH of HTN, breast CA s/p L mastectomy, and CKD who presented to [facility] on 6/14/24 with reports of abdominal pain.  GI was consulted for acute pancreatitis.  She is a DNR status.  Patient is extremely anxious and it was difficult to have a conversation with her because of this.        Subjective  Experienced abdominal pain, went to [facility] ED, signed out AMA, contacted PCP and they ordered CT A/P and was told to go to the hospital.      Abdominal pain almost resolved.  Pain in the mid/central region.  Had n/v x 3 weeks.    Normally regular daily BM's.  Hasn't had a BM in 3 days, passing gas.      EGD-Denies   Colonoscopy-Denies   BM-Daily.  Normal consistency.  No bleeding or weight loss.  FHXUnknown per her.  SHX-No tobacco/illicits/ETOH  Ab Sx-Denies   NSAIDs-Denies         Allergies: as mentioned in H&P      A 10 point review of system is negative except for what is mentioned in the HPI    Vital Signs: Reviewed    Physical Exam:  General: Very anxious  Skin:  Warm and dry, no jaundice  HEENT: No scleral icterus, no conjunctival pallor, normocephalic, atraumatic, mucous membranes moist  Neck:  atraumatic, trachea midline, no JVD  Chest:  Clear to auscultation bilaterally. No wheezes, rales, or rhonchi  CV:  Regular rate and rhythm.  Positive S1/S2  Abdomen: no distension, +BS, soft, non-tender to palpation, no rebound tenderness, no guarding, no rigidity, no discernible ascites   Extremities: no lower extremity edema, chronic pigmentary changes, no cyanosis  Neurological:  A&Ox3  Psychiatric: cooperative     Investigations:  Labs, radiological imaging and cardiac work up were reviewed      Objective:         6/14/2024     7:00 PM  "6/14/2024     7:30 PM 6/14/2024     9:19 PM 6/14/2024    10:15 PM 6/15/2024    12:31 AM 6/15/2024     4:13 AM 6/15/2024     8:15 AM   Vitals   Systolic 183 186 179 179 144 146 174   Diastolic 80 85 89 89 72 70 80   Heart Rate 65 65 68 68 70 58 56   Temp   36.8 °C (98.2 °F) 36.8 °C (98.2 °F) 36.5 °C (97.7 °F) 37 °C (98.6 °F) 36.3 °C (97.3 °F)   Resp 18  18 18      Height (in) 1.702 m (5' 7\")  1.702 m (5' 7.01\") 1.65 m (5' 4.96\")      Weight (lb) 148  147.71 147.71      BMI 23.18 kg/m2  23.13 kg/m2 24.61 kg/m2      BSA (m2) 1.78 m2  1.78 m2 1.75 m2      Visit Report Report Report Report Report             Medications:  enoxaparin, 40 mg, subcutaneous, q24h  polyethylene glycol, 17 g, oral, Daily         Recent Results (from the past 72 hour(s))   Basic Metabolic Panel    Collection Time: 06/14/24 12:22 PM   Result Value Ref Range    Glucose 89 74 - 99 mg/dL    Sodium 129 (L) 136 - 145 mmol/L    Potassium 4.7 3.5 - 5.3 mmol/L    Chloride 94 (L) 98 - 107 mmol/L    Bicarbonate 24 21 - 32 mmol/L    Anion Gap 16 10 - 20 mmol/L    Urea Nitrogen 17 6 - 23 mg/dL    Creatinine 1.11 (H) 0.50 - 1.05 mg/dL    eGFR 48 (L) >60 mL/min/1.73m*2    Calcium 9.6 8.6 - 10.6 mg/dL   CBC and Auto Differential    Collection Time: 06/14/24 12:22 PM   Result Value Ref Range    WBC 4.6 4.4 - 11.3 x10*3/uL    nRBC 0.0 0.0 - 0.0 /100 WBCs    RBC 4.16 4.00 - 5.20 x10*6/uL    Hemoglobin 11.8 (L) 12.0 - 16.0 g/dL    Hematocrit 36.0 36.0 - 46.0 %    MCV 87 80 - 100 fL    MCH 28.4 26.0 - 34.0 pg    MCHC 32.8 32.0 - 36.0 g/dL    RDW 13.9 11.5 - 14.5 %    Platelets 299 150 - 450 x10*3/uL    Neutrophils % 49.7 40.0 - 80.0 %    Immature Granulocytes %, Automated 0.2 0.0 - 0.9 %    Lymphocytes % 21.5 13.0 - 44.0 %    Monocytes % 17.6 2.0 - 10.0 %    Eosinophils % 9.7 0.0 - 6.0 %    Basophils % 1.3 0.0 - 2.0 %    Neutrophils Absolute 2.26 1.60 - 5.50 x10*3/uL    Immature Granulocytes Absolute, Automated 0.01 0.00 - 0.50 x10*3/uL    Lymphocytes Absolute 0.98 " 0.80 - 3.00 x10*3/uL    Monocytes Absolute 0.80 0.05 - 0.80 x10*3/uL    Eosinophils Absolute 0.44 (H) 0.00 - 0.40 x10*3/uL    Basophils Absolute 0.06 0.00 - 0.10 x10*3/uL   Lipase    Collection Time: 06/14/24 12:22 PM   Result Value Ref Range    Lipase 109 (H) 9 - 82 U/L   CBC and Auto Differential    Collection Time: 06/14/24  6:57 PM   Result Value Ref Range    WBC 8.0 4.4 - 11.3 x10*3/uL    nRBC 0.0 0.0 - 0.0 /100 WBCs    RBC 4.05 4.00 - 5.20 x10*6/uL    Hemoglobin 11.7 (L) 12.0 - 16.0 g/dL    Hematocrit 35.3 (L) 36.0 - 46.0 %    MCV 87 80 - 100 fL    MCH 28.9 26.0 - 34.0 pg    MCHC 33.1 32.0 - 36.0 g/dL    RDW 13.8 11.5 - 14.5 %    Platelets 253 150 - 450 x10*3/uL    Neutrophils % 73.9 40.0 - 80.0 %    Immature Granulocytes %, Automated 0.8 0.0 - 0.9 %    Lymphocytes % 7.7 13.0 - 44.0 %    Monocytes % 11.2 2.0 - 10.0 %    Eosinophils % 5.6 0.0 - 6.0 %    Basophils % 0.8 0.0 - 2.0 %    Neutrophils Absolute 5.90 (H) 1.60 - 5.50 x10*3/uL    Immature Granulocytes Absolute, Automated 0.06 0.00 - 0.50 x10*3/uL    Lymphocytes Absolute 0.61 (L) 0.80 - 3.00 x10*3/uL    Monocytes Absolute 0.89 (H) 0.05 - 0.80 x10*3/uL    Eosinophils Absolute 0.45 (H) 0.00 - 0.40 x10*3/uL    Basophils Absolute 0.06 0.00 - 0.10 x10*3/uL   Comprehensive metabolic panel    Collection Time: 06/14/24  6:57 PM   Result Value Ref Range    Glucose 100 (H) 74 - 99 mg/dL    Sodium 128 (L) 136 - 145 mmol/L    Potassium 4.5 3.5 - 5.3 mmol/L    Chloride 96 (L) 98 - 107 mmol/L    Bicarbonate 23 21 - 32 mmol/L    Anion Gap 14 10 - 20 mmol/L    Urea Nitrogen 19 6 - 23 mg/dL    Creatinine 1.08 (H) 0.50 - 1.05 mg/dL    eGFR 50 (L) >60 mL/min/1.73m*2    Calcium 9.2 8.6 - 10.3 mg/dL    Albumin 4.2 3.4 - 5.0 g/dL    Alkaline Phosphatase 81 33 - 136 U/L    Total Protein 6.8 6.4 - 8.2 g/dL    AST 18 9 - 39 U/L    Bilirubin, Total 0.6 0.0 - 1.2 mg/dL    ALT 15 7 - 45 U/L   CBC    Collection Time: 06/15/24  8:41 AM   Result Value Ref Range    WBC 3.4 (L) 4.4 -  11.3 x10*3/uL    nRBC 0.0 0.0 - 0.0 /100 WBCs    RBC 3.15 (L) 4.00 - 5.20 x10*6/uL    Hemoglobin 9.6 (L) 12.0 - 16.0 g/dL    Hematocrit 27.4 (L) 36.0 - 46.0 %    MCV 87 80 - 100 fL    MCH 30.5 26.0 - 34.0 pg    MCHC 35.0 32.0 - 36.0 g/dL    RDW 14.2 11.5 - 14.5 %    Platelets 231 150 - 450 x10*3/uL          Assessment:  This is an 89 yo Female with a PMH of HTN, breast CA s/p L mastectomy, and CKD who presented to [facility] on 6/14/24 with reports of abdominal pain.  GI was consulted for acute pancreatitis. Recent onset of abdominal pain and prior n/v x 3 weeks.  No prior history of pancreatitis.  No known risk factors including EtOH.  Pain almost resolved.  She would like to eat food.  Passing gas.  Recent onset of constipation. Glucose 571, K+ 6.7, got kayexalate.  Hgb 9.6 from 11.7. LFT's normal.  Lipase 119.  Triglycerides normal at 90.  CT A/P 6/14 suggested acute pancreatitis, and umbilical hernia.  US 6/14 showed cholelithiasis.  Could be a stone that passed but her tests were always normal.        Plan  1.)  Acute pancreatitis-Serial abdominal exams, encourage mobilization, trend LFT's.  Would recommend outpatient MRCP to evaluate the pancreatic head on CT imaging to ensure it is not malignancy.  Can start a CLD and advance as tolerated.    Will sign off.  Discussed patient with Dr. Christian.    I spent 60 minutes in the professional and overall care of this patient.      06/15/24 at 9:07 AM - Sara Maddox, TAHIR-CNP

## 2024-06-15 NOTE — PROGRESS NOTES
Physical Therapy    Physical Therapy Evaluation    Patient Name: Nehal Walden  MRN: [id number]  Today's Date: 6/15/2024   Time Calculation  Start Time: 1254  Stop Time: 1314  Time Calculation (min): 20 min  601/601-A    Assessment/Plan   PT Assessment  PT Assessment Results: Decreased strength, Decreased endurance, Impaired balance, Decreased mobility, Decreased safety awareness, Impaired hearing  Rehab Prognosis: Good  Evaluation/Treatment Tolerance: Patient limited by fatigue  Medical Staff Made Aware: Yes  End of Session Communication: Bedside nurse  Assessment Comment: Pt presents /c above impairments and decline from functional baseline. Pt will benefit from continued PT services at mod intensity to address above and decrease fall risk prior to discharge home alone.   End of Session Patient Position: Bed, 2 rail up, Alarm on  IP OR SWING BED PT PLAN  Inpatient or Swing Bed: Inpatient  PT Plan  Treatment/Interventions: Bed mobility, Transfer training, Gait training, Balance training, Neuromuscular re-education, Strengthening, Endurance training, Therapeutic exercise, Therapeutic activity  PT Plan: Ongoing PT  PT Frequency: 4 times per week  PT Discharge Recommendations: Moderate intensity level of continued care  PT - OK to Discharge: Yes    Subjective     Current Problem:  1. Acute pancreatitis, unspecified complication status, unspecified pancreatitis type ([facility])          Patient Active Problem List   Diagnosis    Benign essential hypertension    DDD (degenerative disc disease), lumbar    Diverticulosis    PVC (premature ventricular contraction)    Right shoulder pain    Stage 3a chronic kidney disease (Multi)    Medicare annual wellness visit, subsequent    Malignant neoplasm involving both nipple and areola of left breast in female, unspecified estrogen receptor status (Multi)    Breast cancer (Multi)    Acute pancreatitis, unspecified complication status, unspecified pancreatitis type ([facility])        General Visit Information:  General  Reason for Referral: PT eval and treat  Referred By: Osvaldo  Past Medical History Relevant to Rehab: CKD, HTN, breast ca s/p L mastectomy  Prior to Session Communication: Bedside nurse  Patient Position Received: Bed, 2 rail up, Alarm off, not on at start of session  General Comment: Pt presents with n/v, body aches ~4 weeks. Outpt CT: pancreatitis. GI and endo consulted. Pt cleared for therapy by nursing. Pt supine, agreeable.    Home Living:  Home Living  Home Living Comments: Pt lives alone, 2 story house. No steps to enter, full flight to bed/bath.    Prior Level of Function:  Prior Function Per Pt/Caregiver Report  Prior Function Comments: Indep at baseline, +drives, denies AD use. H/o 2 falls [place] - per pt d/t medication adjustments. Pt denies any social support/family.    Precautions:  Precautions  Precautions Comment: falls, safety, NPO       Objective     Pain:  Pain Assessment  Pain Assessment: 0-10  Pain Score:  (denies pain, but c/o continued nausea, abdominal discomfort; nursing notified)    Cognition:  Cognition  Overall Cognitive Status: Within Functional Limits  Orientation Level:  (Pt oriented, but /c questionable insight/safety awareness. Pt reports h/o falls d/t syncope and /c weakness today, but states she has been getting up on her own in the hospital. Safety reinforced this date, alarm placed.)    General Assessments:  General Observation  General Observation: activity orders: up in chair  lines: PIV  skin integrity: WFL   Activity Tolerance  Endurance: Decreased tolerance for upright activites  Sensation  Sensation Comment: denies n/t  Strength  Strength Comments: BLE at least 3/5           Dynamic Sitting Balance  Dynamic Sitting-Comments: G  Dynamic Standing Balance  Dynamic Standing-Comments: F    Functional Assessments:     Bed Mobility  Bed Mobility: Yes  Bed Mobility 1  Bed Mobility Comments 1: Supine<>Sit /c minAx1,  use of rail, slightly increased time.  Transfers  Transfer: Yes  Transfer 1  Trials/Comments 1: Sit<>Stand /c minAx1, slow transfer. Denies dizziness, but reports continued nausea.  Ambulation/Gait Training  Ambulation/Gait Training Performed:  (Pt ambulates bedside ~3' /c HHA, minAx1. Discontinuous step to pattern, slightly flexed posture. Pt reaching out for IV pole/additional support. Distance limited d/t worsening nausea.)          Outcome Measures:     [facility] Basic Mobility  Turning from your back to your side while in a flat bed without using bedrails: A little  Moving from lying on your back to sitting on the side of a flat bed without using bedrails: A little  Moving to and from bed to chair (including a wheelchair): A little  Standing up from a chair using your arms (e.g. wheelchair or bedside chair): A little  To walk in hospital room: A little  Climbing 3-5 steps with railing: A lot  Basic Mobility - Total Score: 17          Goals:  Encounter Problems       Encounter Problems (Active)       PT Problem       STG - Pt will transition supine <> sitting with mod I (Progressing)       Start:  06/15/24    Expected End:  06/29/24            STG - Pt will transfer STS with mod I (Progressing)       Start:  06/15/24    Expected End:  06/29/24            STG - Pt will amb >/=75' using LRAD with mod I (Progressing)       Start:  06/15/24    Expected End:  06/29/24            STG - Pt will maintain G dynamic standing balance to decrease risk of falls (Progressing)       Start:  06/15/24    Expected End:  06/29/24            STG - Pt will perform 2-3 sets of BLE therex x10 to maximize functional strength and independence  (Progressing)       Start:  06/15/24    Expected End:  06/29/24                 Education Documentation  Mobility Training, taught by Val Hu, PT at 6/15/2024  2:01 PM.  Learner: Patient  Readiness: Acceptance  Method: Explanation  Response: Verbalizes Understanding, Needs  Reinforcement    Education Comments  No comments found.

## 2024-06-15 NOTE — H&P
History Of Present Illness  Nehal Walden is a 89 y.o. female with past medical history significant for HTN, breast cancer s/p left mastectomy, CKD G3a, who presents to [facility] ED on 6/14 with chief complaint of abdominal pain in the setting of outpatient CTAP showing evidence of pancreatitis.  Briefly, patient was seen at [facility] ED on 6/8 for abdominal pain, nausea, vomiting however refused admission and left AMA.  Patient then reached out to PCP who ordered outpatient CT abdomen pelvis.  She currently denies abdominal pain, nausea, vomiting, stating that the Zofran alleviated symptoms.  She states she has not been able to keep anything down since symptoms began a few days ago.  She endorses associated weakness and dizziness.  Denies chest pain, diaphoresis.  CODE STATUS discussed and patient elects to be a CCA.    Vitals: BP minimally elevated at 183/81    ED course: Outpatient CT abdomen pelvis showed evidence of acute pancreatitis with common bile duct dilatation, follow-up ultrasound of gallbladder showed cholelithiasis without gallbladder wall thickening or dilatation per radiology reports. Labs remarkable for lipase 109, sodium 128, creatinine 1.0, EGFR 50, H/H 11.7/85.3.  Patient treated with LR bolus 1000 mL, Zofran.     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 She has never used smokeless tobacco. She reports current alcohol use. She reports that she does not use drugs.    Family History  No family history on file.     Allergies  Peanut and Sulfa (sulfonamide antibiotics)    Review of Systems  10 point ROS negative except as specified in HPI    Physical Exam  Constitutional:       General: She is not in acute distress.     Appearance: She is normal weight. She is ill-appearing.   HENT:      Head: Normocephalic and atraumatic.      Right Ear: External ear normal.      Left Ear: External ear normal.      Nose: Nose normal.      Mouth/Throat:      Pharynx: Oropharynx is clear.   Eyes:      Conjunctiva/sclera: Conjunctivae normal.   Cardiovascular:      Rate and Rhythm: Normal rate and regular rhythm.   Pulmonary:      Breath sounds: Normal breath sounds.   Abdominal:      General: Abdomen is flat. There is no distension.      Palpations: Abdomen is soft.      Tenderness: There is no abdominal tenderness. There is no guarding.   Musculoskeletal:      Comments: Moving all extremities   Skin:     General: Skin is warm and dry.   Neurological:      General: No focal deficit present.      Mental Status: She is alert and oriented to person, place, and time.   Psychiatric:      Comments: Slightly anxious          Last Recorded Vitals  Blood pressure (!) 186/85, pulse 65, temperature 37.3 °C (99.1 °F), temperature source Temporal, resp. rate 18, height 1.702 m (5' 7\"), weight 67.1 kg (148 lb), SpO2 98%.    Relevant Results    Results for orders placed or performed during the hospital encounter of 06/14/24 (from the past 24 hour(s))   CBC and Auto Differential   Result Value Ref Range    WBC 8.0 4.4 - 11.3 x10*3/uL    nRBC 0.0 0.0 - 0.0 /100 WBCs    RBC 4.05 4.00 - 5.20 x10*6/uL    Hemoglobin 11.7 (L) 12.0 - 16.0 g/dL    " Hematocrit 35.3 (L) 36.0 - 46.0 %    MCV 87 80 - 100 fL    MCH 28.9 26.0 - 34.0 pg    MCHC 33.1 32.0 - 36.0 g/dL    RDW 13.8 11.5 - 14.5 %    Platelets 253 150 - 450 x10*3/uL    Neutrophils % 73.9 40.0 - 80.0 %    Immature Granulocytes %, Automated 0.8 0.0 - 0.9 %    Lymphocytes % 7.7 13.0 - 44.0 %    Monocytes % 11.2 2.0 - 10.0 %    Eosinophils % 5.6 0.0 - 6.0 %    Basophils % 0.8 0.0 - 2.0 %    Neutrophils Absolute 5.90 (H) 1.60 - 5.50 x10*3/uL    Immature Granulocytes Absolute, Automated 0.06 0.00 - 0.50 x10*3/uL    Lymphocytes Absolute 0.61 (L) 0.80 - 3.00 x10*3/uL    Monocytes Absolute 0.89 (H) 0.05 - 0.80 x10*3/uL    Eosinophils Absolute 0.45 (H) 0.00 - 0.40 x10*3/uL    Basophils Absolute 0.06 0.00 - 0.10 x10*3/uL   Comprehensive metabolic panel   Result Value Ref Range    Glucose 100 (H) 74 - 99 mg/dL    Sodium 128 (L) 136 - 145 mmol/L    Potassium 4.5 3.5 - 5.3 mmol/L    Chloride 96 (L) 98 - 107 mmol/L    Bicarbonate 23 21 - 32 mmol/L    Anion Gap 14 10 - 20 mmol/L    Urea Nitrogen 19 6 - 23 mg/dL    Creatinine 1.08 (H) 0.50 - 1.05 mg/dL    eGFR 50 (L) >60 mL/min/1.73m*2    Calcium 9.2 8.6 - 10.3 mg/dL    Albumin 4.2 3.4 - 5.0 g/dL    Alkaline Phosphatase 81 33 - 136 U/L    Total Protein 6.8 6.4 - 8.2 g/dL    AST 18 9 - 39 U/L    Bilirubin, Total 0.6 0.0 - 1.2 mg/dL    ALT 15 7 - 45 U/L     US gallbladder    Result Date: 6/14/2024  STUDY: Right Upper Quadrant Ultrasound; 6/14/2024 6:55 PM INDICATION: Abdominal pain.  Nausea and vomiting. COMPARISON: None Available. ACCESSION NUMBER(S): [id number] ORDERING CLINICIAN: JULIUS ANTONY TECHNIQUE: Ultrasound of the Right Upper Quadrant. FINDINGS: LIVER: The liver demonstrates normal echogenicity.   GALLBLADDER: The gallbladder The gallbladder contains a single stone.   There is no pericholecystic fluid or wall thickening.  Sonographic Navarro's sign is reported to be negative.  BILE DUCTS: The common bile duct measures 0.9 cm.  There is no intrahepatic biliary  dilatation.   PANCREAS: The pancreas demonstrates a normal homogeneous echotexture with the tail not well seen due to overlying bowel gas.  RIGHT KIDNEY: The right kidney measures 8.2 cm in length.  Renal cortical echotexture is normal.  There is no hydronephrosis.  There are no stones.  There are no cysts.    Cholelithiasis.  No gallbladder wall thickening or biliary dilatation is appreciated. Signed by Rene Chen MD    CT abdomen pelvis wo IV contrast    Result Date: 6/14/2024  Interpreted By:  Jean Claude Mercado, STUDY: CT ABDOMEN PELVIS WO IV CONTRAST; 6/14/2024 12:55 pm   INDICATION: Signs/Symptoms:abdominal pain/nausea/vomiting/diarrhea.   COMPARISON: None.   ACCESSION NUMBER(S): [id number]   ORDERING CLINICIAN: MOLINA WANG   TECHNIQUE: COMMENTS: This exam is performed without oral or intravenous contrast as was requested. Please note that the absence of oral and intravenous contrast material does limit the sensitivity and specificity of this examination. In particular solid organ assessment for neoplasm is significantly limited. Assessment of the GI tract is also limited without the aid of oral contrast administration. Vascular abnormalities such as dissection, occlusions, infarcts and thrombus may also go undetected.   One or more of the following dose reduction techniques were used: Automated exposure control Adjustment of the mA and/or kV according to patient size, and/or use of iterative reconstruction technique.   FINDINGS: Pulmonary bases are somewhat obscured by motion artifact. Lingular subsegmental atelectasis is present.   Within limits of this unenhanced study no focal masses are identified within the liver, spleen, or adrenal glands.  Right kidney is unremarkable. There is a 8 mm hyperdense focus upper pole left kidney posteriorly likely representing hyperdense cyst such as a hemorrhagic cyst. The 1.2 cm hypodensity lower pole left kidney medially likely representing a cyst.   There is a slightly  hazy appearance of the peripancreatic fat adjacent to the pancreatic head which appears to be mild diffusely enlarged. Body and tail of the pancreas are normal. There is also slight dilatation of the common bile duct measuring up to 11 mm in diameter.   There is no evidence for abdominal aortic aneurysm. No retroperitoneal lymphadenopathy is identified. There is a small mesenteric fat containing umbilical hernia.   PELVIC CT: No pathologic masses or fluid collections are identified. Lumbar spondylosis is present along with bilateral hip osteoarthritis. Spinal canal stenosis is present at L4-5 secondary to facet arthropathy and disc herniation. Calcified uterine fibroids are present. No pelvic lymphadenopathy is identified.       1. Subtle abnormal appearance to the pancreatic head suggesting acute pancreatitis. Please correlate clinically. Common duct dilatation is present measuring 11 mm. Right upper quadrant ultrasound may be useful for further characterization of these findings. 2. Probable left-sided renal cysts as described above. 3. Calcified uterine fibroids. 4. Lumbar spondylosis with L4-5 spinal canal stenosis. 5. Small mesenteric fat containing umbilical hernia.   MACRO: none   Signed by: Jean Claude Mercado 6/14/2024 2:39 PM Dictation workstation:   [id number]        Assessment/Plan   Principal Problem:    Acute pancreatitis, unspecified complication status, unspecified pancreatitis type ([facility])    89-year-old female with chief complaint of abdominal pain and associated nausea and vomiting diagnosed with acute pancreatitis will be admitted to inpatient with telemetry under Dr. Bryant for further evaluation and care.    #Acute pancreatitis  #Abdominal pain  #Nausea, vomiting  #hyponatremia  #generalized weakness  - GI consult  - N.p.o, advance to clear liquids as tolerated   - Continue LR fluids  - Continue Zofran  - Morphine for pain as needed  - AM labs  -PT/OT/social work   -DNR-CCA     #DVT PPx  -  Heparin  - SCD    Chronic conditions  #HTN: monitor q4 hrs, telemetry, antihypertensives as needed        I spent 60 minutes in the professional and overall care of this patient.      Arron Riley PA-C

## 2024-06-16 VITALS
DIASTOLIC BLOOD PRESSURE: 76 MMHG | TEMPERATURE: 97.3 F | HEIGHT: 65 IN | OXYGEN SATURATION: 95 % | SYSTOLIC BLOOD PRESSURE: 185 MMHG | RESPIRATION RATE: 18 BRPM | WEIGHT: 147.71 LBS | BODY MASS INDEX: 24.61 KG/M2 | HEART RATE: 61 BPM

## 2024-06-16 LAB
ALBUMIN SERPL BCP-MCNC: 3.8 G/DL (ref 3.4–5)
ALP SERPL-CCNC: 70 U/L (ref 33–136)
ALT SERPL W P-5'-P-CCNC: 14 U/L (ref 7–45)
ANION GAP SERPL CALC-SCNC: 13 MMOL/L (ref 10–20)
AST SERPL W P-5'-P-CCNC: 13 U/L (ref 9–39)
BILIRUB SERPL-MCNC: 0.7 MG/DL (ref 0–1.2)
BUN SERPL-MCNC: 9 MG/DL (ref 6–23)
CALCIUM SERPL-MCNC: 8.6 MG/DL (ref 8.6–10.3)
CHLORIDE SERPL-SCNC: 100 MMOL/L (ref 98–107)
CO2 SERPL-SCNC: 20 MMOL/L (ref 21–32)
CREAT SERPL-MCNC: 0.69 MG/DL (ref 0.5–1.05)
CREAT UR-MCNC: 71.1 MG/DL (ref 20–320)
EGFRCR SERPLBLD CKD-EPI 2021: 84 ML/MIN/1.73M*2
ERYTHROCYTE [DISTWIDTH] IN BLOOD BY AUTOMATED COUNT: 13.8 % (ref 11.5–14.5)
EST. AVERAGE GLUCOSE BLD GHB EST-MCNC: 131 MG/DL
GLUCOSE BLD MANUAL STRIP-MCNC: 101 MG/DL (ref 74–99)
GLUCOSE BLD MANUAL STRIP-MCNC: 112 MG/DL (ref 74–99)
GLUCOSE BLD MANUAL STRIP-MCNC: 94 MG/DL (ref 74–99)
GLUCOSE BLD MANUAL STRIP-MCNC: 95 MG/DL (ref 74–99)
GLUCOSE BLD MANUAL STRIP-MCNC: 97 MG/DL (ref 74–99)
GLUCOSE SERPL-MCNC: 100 MG/DL (ref 74–99)
HBA1C MFR BLD: 6.2 %
HCT VFR BLD AUTO: 33 % (ref 36–46)
HGB BLD-MCNC: 10.7 G/DL (ref 12–16)
LIPASE SERPL-CCNC: 86 U/L (ref 9–82)
MCH RBC QN AUTO: 28 PG (ref 26–34)
MCHC RBC AUTO-ENTMCNC: 32.4 G/DL (ref 32–36)
MCV RBC AUTO: 86 FL (ref 80–100)
NRBC BLD-RTO: 0 /100 WBCS (ref 0–0)
OSMOLALITY UR: 389 MOSM/KG (ref 200–1200)
PLATELET # BLD AUTO: 246 X10*3/UL (ref 150–450)
POTASSIUM SERPL-SCNC: 3.8 MMOL/L (ref 3.5–5.3)
PROT SERPL-MCNC: 6.3 G/DL (ref 6.4–8.2)
RBC # BLD AUTO: 3.82 X10*6/UL (ref 4–5.2)
SODIUM SERPL-SCNC: 129 MMOL/L (ref 136–145)
SODIUM UR-SCNC: 109 MMOL/L
SODIUM/CREAT UR-RTO: 153 MMOL/G CREAT
WBC # BLD AUTO: 9.4 X10*3/UL (ref 4.4–11.3)

## 2024-06-16 PROCEDURE — 82947 ASSAY GLUCOSE BLOOD QUANT: CPT | Mod: 91

## 2024-06-16 PROCEDURE — 80053 COMPREHEN METABOLIC PANEL: CPT | Performed by: INTERNAL MEDICINE

## 2024-06-16 PROCEDURE — 2500000004 HC RX 250 GENERAL PHARMACY W/ HCPCS (ALT 636 FOR OP/ED)

## 2024-06-16 PROCEDURE — 36415 COLL VENOUS BLD VENIPUNCTURE: CPT | Performed by: INTERNAL MEDICINE

## 2024-06-16 PROCEDURE — 83935 ASSAY OF URINE OSMOLALITY: CPT | Mod: PARLAB | Performed by: INTERNAL MEDICINE

## 2024-06-16 PROCEDURE — 83690 ASSAY OF LIPASE: CPT | Performed by: INTERNAL MEDICINE

## 2024-06-16 PROCEDURE — 85027 COMPLETE CBC AUTOMATED: CPT | Performed by: INTERNAL MEDICINE

## 2024-06-16 PROCEDURE — 82570 ASSAY OF URINE CREATININE: CPT | Performed by: INTERNAL MEDICINE

## 2024-06-16 PROCEDURE — 2500000004 HC RX 250 GENERAL PHARMACY W/ HCPCS (ALT 636 FOR OP/ED): Performed by: INTERNAL MEDICINE

## 2024-06-16 PROCEDURE — 1200000002 HC GENERAL ROOM WITH TELEMETRY DAILY

## 2024-06-16 PROCEDURE — 83036 HEMOGLOBIN GLYCOSYLATED A1C: CPT | Performed by: INTERNAL MEDICINE

## 2024-06-16 RX ORDER — SODIUM CHLORIDE, SODIUM LACTATE, POTASSIUM CHLORIDE, CALCIUM CHLORIDE 600; 310; 30; 20 MG/100ML; MG/100ML; MG/100ML; MG/100ML
100 INJECTION, SOLUTION INTRAVENOUS CONTINUOUS
Status: DISCONTINUED | OUTPATIENT
Start: 2024-06-16 | End: 2024-06-17

## 2024-06-16 RX ADMIN — SODIUM CHLORIDE, POTASSIUM CHLORIDE, SODIUM LACTATE AND CALCIUM CHLORIDE 100 ML/HR: 600; 310; 30; 20 INJECTION, SOLUTION INTRAVENOUS at 10:18

## 2024-06-16 RX ADMIN — ONDANSETRON 4 MG: 2 INJECTION, SOLUTION INTRAMUSCULAR; INTRAVENOUS at 01:42

## 2024-06-16 RX ADMIN — SODIUM CHLORIDE, POTASSIUM CHLORIDE, SODIUM LACTATE AND CALCIUM CHLORIDE 100 ML/HR: 600; 310; 30; 20 INJECTION, SOLUTION INTRAVENOUS at 20:30

## 2024-06-16 RX ADMIN — ENOXAPARIN SODIUM 40 MG: 40 INJECTION SUBCUTANEOUS at 20:30

## 2024-06-16 ASSESSMENT — PAIN SCALES - GENERAL: PAINLEVEL_OUTOF10: 0 - NO PAIN

## 2024-06-16 NOTE — CONSULTS
Endocrinology Initial Inpatient Consult Note     PATIENT NAME: Nehal Walden  MRN: [id number]  DATE: 6/16/2024    CONSULTING PHYSICIAN: Dr. Bryant  REASON FOR CONSULT: Hyperglycemia.      History of Presenting Illness     89 y F w. PMHx of:      # Breast Cancer s/p Left Mastectomy      # Stage III CKD      # Hypertension      # Dyslipidemia    Patient presented to [facility] ER on 6/14 complaining of worsening abdominal pain, nausea, and vomiting.  The patient reports that she saw her primary care doctor on 6/14 with similar complaints.  She was sent for an outpatient CT scan.  This showed findings of acute pancreatitis and she was immediately referred to the ER.  She reports that she was seen for the same complaints over at [facility] back on 6/8.  The patient refused admission.  Reports generalized abdominal pain mostly in the upper quadrants.  Unable to keep down liquids or solids.  Denies any sick contacts.  No new medications.    In the ER, lipase 2109.  CBC was unremarkable except for a normocytic hypochromic anemia with hemoglobin of 11.7.  The patient's CMP was remarkable for hyponatremia to 128.  She was hyperglycemic to 100.  Creatinine was found to be 1.08.  The patient subsequent admitted for further management.CT of the abdomen pelvis showed findings of acute pancreatitis.  There was CBD dilation 11 mm.  Right upper quadrant ultrasound was done which showed no biliary dilation.    In regards to diabetes, the patient reports she is never been told she had the same.  She denies any polyuria or polydipsia.  She is nauseous still.  She did have liquid diet for breakfast this morning and did not eat any of it.  She states she had emesis yesterday.      Review of Systems (Bold if Positive)     General: Fevers, Chills, Weight Loss, or Night Sweats  HEENT: Headaches or Blurry Vision  Cardiovascular: CP, Palpitations, Diaphoresis, or Peripheral Edema  Respiratory: Cough, Shortness of Breath, or  "Hemoptysis  Gastrointestinal: N/V, Abdominal Pain, Constipation, Diarrhea, Melena, Hematochezia  Genitourinary: Polyruia, Dysuria, Urgency   Endo: Polydipsia, Heat/Cold Intolerance, Hair/Skin/Nail Changes  Rheum: Joint Pain   MSK: LBP, Muscle Pain  Psych: Anxiety, Depression      Physical Examination     /80   Pulse 76   Temp 36.7 °C (98.1 °F)   Resp 18   Ht 1.65 m (5' 4.96\")   Wt 67 kg (147 lb 11.3 oz)   SpO2 96%   BMI 24.61 kg/m²   General: No acute distress. A&Ox3.  Appears stated age.  HEENT: PERRLA. EOMi. Ø Exophthalmos   Neck: No LAD.  Thyroid: 20 g.  No palpable nodularities.  Ø Bruit  CV: Normal rate and rhythm. No murmurs or rubs auscultated.   Resp: CTA b/l. No wheezing or crackles.   Abdomen: Soft, nondistended abdomen.  Generalized abdominal tenderness in the right and left upper quadrant.  Negative Navarro sign.  Extremities: No pedal edema b/l.  Neuro: CN II-XII Grossly Intact. Ø Tremor. Brisk Reflexes.   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  Social History     Socioeconomic History    Marital status:      Spouse name: Not on file    " Number of children: Not on file    Years of education: Not on file    Highest education level: Not on file   Occupational History    Not on file   Tobacco Use    Smoking status: Never    Smokeless tobacco: Never   Vaping Use    Vaping status: Never Used   Substance and Sexual Activity    Alcohol use: Yes     Comment: rarely drinks wine    Drug use: Never    Sexual activity: Not Currently   Other Topics Concern    Not on file   Social History Narrative    Not on file     Social Determinants of Health     Financial Resource Strain: Patient Declined (6/14/2024)    Overall Financial Resource Strain (CARDIA)     Difficulty of Paying Living Expenses: Patient declined   Food Insecurity: Not on file   Transportation Needs: Patient Declined (6/14/2024)    PRAPARE - Transportation     Lack of Transportation (Medical): Patient declined     Lack of Transportation (Non-Medical): Patient declined   Physical Activity: Not on file   Stress: Not on file   Social Connections: Not on file   Intimate Partner Violence: Not on file   Housing Stability: Patient Declined (6/14/2024)    Housing Stability Vital Sign     Unable to Pay for Housing in the Last Year: Patient declined     Number of Places Lived in the Last Year: 1     Unstable Housing in the Last Year: Patient declined       Medications & Allergies     MAR and PTA Meds Reviewed     Allergies   Allergen Reactions    Peanut Unknown    Sulfa (Sulfonamide Antibiotics) Other       Data     Recent Labs and Imaging Reviewed      Assessment / Plan       # Hyperglycemia  In the setting of acute pancreatitis.  Has no history of the same.  The patient reportedly had a blood sugar on her CMP the morning of 6/15 to 571.  She was also hyperkalemic on that blood work.  She received 10 units of IV regular insulin for what I suspect was the hypokalemia.  The patient became hypoglycemic after that.  Frankly I do not know if her sugar of 500 was even accurate.  She has not had any hypoglycemia  since then.  She very well may have impaired glucose tolerance i.e. prediabetes, will obtain a hemoglobin A1c.  I will continue to follow her point-of-care blood sugars to make sure she does not have any recurrence of her severe hyperglycemia.  I think it is reasonable to check her sugars with meals and at bedtime for now.  I will discontinue her sliding scale as well so as to avoid hypoglycemia.    # Acute Pancreatitis: Patient with epigastric pain as well as radiographic findings of the same.  Lipase medially elevated.  Nonetheless she does fulfill criteria for this.  The patient is being hydrated by the primary team.  Will need to make sure she does not develop any endocrine deficiencies from the same.  Unclear cause of her pancreatitis.    # Hyponatremia: Likely hypotonic hypovolemic in the setting of acute pancreatitis and possible third spacing.  Nonetheless, the patient has been receiving hypotonic fluid by means of half-normal saline.  I would like to discontinue this.  I will actually just placed her on lactated Ringer's which is isotonic.  We will see what her sodium does.       Bi Christopher, DO  Endocrinology, Diabetes, and Metabolism    Available via EP[employer] Messenger    Please excuse any typographical or unwanted errors within this documentation as voice recognition software was used to dictate this note.

## 2024-06-16 NOTE — PROGRESS NOTES
Nehal Walden is a 89 y.o. female on day 2 of admission presenting with Acute pancreatitis, unspecified complication status, unspecified pancreatitis type ([facility]-HCC).      Subjective   Seen today pancreatitis is resolving she denies any abdominal pain.  Yesterday she had hypoglycemia her blood sugar was 571.  Apparently 10 units of regular I ordered was given IV I did not order it that way.  When I stated to give 10 units of regular she was not acidotic and for her potassium,  I ordered Kayexalate for that .   The regular insulin was to be given subcu and then to follow-up with blood sugar so I was surprised when I heard that it  came down to 51 later I learned that the regular insulin had been given IV push, nevertheless the value of 571 was a venous draw and was hard to believe that it had dropped regardless of IV to 51 over that short time course.  So the 571 may not have been accurate retrospectively.       Objective     Last Recorded Vitals  BP (!) 192/89   Pulse 70   Temp (!) 23.8 °C (74.8 °F)   Resp 18   Wt 67 kg (147 lb 11.3 oz)   SpO2 97%   Intake/Output last 3 Shifts:    Intake/Output Summary (Last 24 hours) at 6/16/2024 1634  Last data filed at 6/16/2024 0659  Gross per 24 hour   Intake 1932.53 ml   Output --   Net 1932.53 ml       Admission Weight  Weight: 67.1 kg (148 lb) (06/14/24 1900)    Daily Weight  06/14/24 : 67 kg (147 lb 11.3 oz)    Image Results  US gallbladder  Narrative: STUDY:  Right Upper Quadrant Ultrasound; 6/14/2024 6:55 PM  INDICATION:  Abdominal pain.  Nausea and vomiting.  COMPARISON:  None Available.  ACCESSION NUMBER(S):  [id number]  ORDERING CLINICIAN:  JULIUS ANTONY  TECHNIQUE:  Ultrasound of the Right Upper Quadrant.  FINDINGS:  LIVER:  The liver demonstrates normal echogenicity.       GALLBLADDER:  The gallbladder The gallbladder contains a single stone.   There is no  pericholecystic fluid or wall thickening.  Sonographic Navarro's sign  is reported to be negative.       BILE DUCTS:  The common bile duct measures 0.9 cm.  There is no intrahepatic  biliary dilatation.       PANCREAS:  The pancreas demonstrates a normal homogeneous echotexture with the  tail not well seen due to overlying bowel gas.      RIGHT KIDNEY:  The right kidney measures 8.2 cm in length.  Renal cortical  echotexture is normal.  There is no hydronephrosis.  There are no  stones.  There are no cysts.  Impression: Cholelithiasis.  No gallbladder wall thickening or biliary dilatation  is appreciated.  Signed by Rene Chen MD  CT abdomen pelvis wo IV contrast  Narrative: Interpreted By:  Jean Claude Mercado,   STUDY:  CT ABDOMEN PELVIS WO IV CONTRAST; 6/14/2024 12:55 pm      INDICATION:  Signs/Symptoms:abdominal pain/nausea/vomiting/diarrhea.      COMPARISON:  None.      ACCESSION NUMBER(S):  [id number]      ORDERING CLINICIAN:  MOLINA WANG      TECHNIQUE:  COMMENTS: This exam is performed without oral or intravenous contrast  as was requested. Please note that the absence of oral and  intravenous contrast material does limit the sensitivity and  specificity of this examination. In particular solid organ assessment  for neoplasm is significantly limited. Assessment of the GI tract is  also limited without the aid of oral contrast administration.  Vascular abnormalities such as dissection, occlusions, infarcts and  thrombus may also go undetected.      One or more of the following dose reduction techniques were used:  Automated exposure control Adjustment of the mA and/or kV according  to patient size, and/or use of iterative reconstruction technique.      FINDINGS:  Pulmonary bases are somewhat obscured by motion artifact. Lingular  subsegmental atelectasis is present.   Within limits of this  unenhanced study no focal masses are identified within the liver,  spleen, or adrenal glands.  Right kidney is unremarkable. There is a  8 mm hyperdense focus upper pole left kidney posteriorly likely  representing  hyperdense cyst such as a hemorrhagic cyst. The 1.2 cm  hypodensity lower pole left kidney medially likely representing a  cyst.      There is a slightly hazy appearance of the peripancreatic fat  adjacent to the pancreatic head which appears to be mild diffusely  enlarged. Body and tail of the pancreas are normal. There is also  slight dilatation of the common bile duct measuring up to 11 mm in  diameter.      There is no evidence for abdominal aortic aneurysm. No  retroperitoneal lymphadenopathy is identified. There is a small  mesenteric fat containing umbilical hernia.      PELVIC CT: No pathologic masses or fluid collections are identified.  Lumbar spondylosis is present along with bilateral hip  osteoarthritis. Spinal canal stenosis is present at L4-5 secondary to  facet arthropathy and disc herniation. Calcified uterine fibroids are  present. No pelvic lymphadenopathy is identified.      Impression: 1. Subtle abnormal appearance to the pancreatic head suggesting acute  pancreatitis. Please correlate clinically. Common duct dilatation is  present measuring 11 mm. Right upper quadrant ultrasound may be  useful for further characterization of these findings.  2. Probable left-sided renal cysts as described above.  3. Calcified uterine fibroids.  4. Lumbar spondylosis with L4-5 spinal canal stenosis.  5. Small mesenteric fat containing umbilical hernia.      MACRO:  none      Signed by: Jean Claude Mercado 6/14/2024 2:39 PM  Dictation workstation:   [id number]    Results from last 7 days   Lab Units 06/16/24  0631   WBC AUTO x10*3/uL 9.4   HEMOGLOBIN g/dL 10.7*   HEMATOCRIT % 33.0*   PLATELETS AUTO x10*3/uL 246      Results from last 7 days   Lab Units 06/16/24  0631   SODIUM mmol/L 129*   POTASSIUM mmol/L 3.8   CHLORIDE mmol/L 100   CO2 mmol/L 20*   BUN mg/dL 9   CREATININE mg/dL 0.69   GLUCOSE mg/dL 100*   CALCIUM mg/dL 8.6      In the review of systems no further abdominal pain no vomiting or diarrhea    Physical  Exam  Awake and alert lungs are diminished but clear heart has a regular rate and rhythm abdomen is soft is not distended or firm  Relevant Results               Assessment/Plan                  Principal Problem:    Acute pancreatitis, unspecified complication status, unspecified pancreatitis type ([facility])    Acute pancreatitis now resolved she feels much better her triglycerides 90 I believe it may be a passing stone she has cholelithiasis so stone down her duct could have caused a transient elevation in transient pancreatitis which it did.      For now I will review her A1c monitor her blood sugars closely corrected her serum sodiums her acute pancreatitis resolved and I will review her lipase and her LFTs as well continue with all of her other present medications and I will reevaluate her tomorrow              Dionicio Bryant DO

## 2024-06-17 LAB
ALBUMIN SERPL BCP-MCNC: 3.9 G/DL (ref 3.4–5)
ALBUMIN SERPL BCP-MCNC: 3.9 G/DL (ref 3.4–5)
ALP SERPL-CCNC: 77 U/L (ref 33–136)
ALT SERPL W P-5'-P-CCNC: 14 U/L (ref 7–45)
ANION GAP SERPL CALC-SCNC: 13 MMOL/L (ref 10–20)
AST SERPL W P-5'-P-CCNC: 20 U/L (ref 9–39)
BILIRUB DIRECT SERPL-MCNC: 0.1 MG/DL (ref 0–0.3)
BILIRUB SERPL-MCNC: 0.9 MG/DL (ref 0–1.2)
BUN SERPL-MCNC: 9 MG/DL (ref 6–23)
CALCIUM SERPL-MCNC: 8.9 MG/DL (ref 8.6–10.3)
CHLORIDE SERPL-SCNC: 102 MMOL/L (ref 98–107)
CO2 SERPL-SCNC: 19 MMOL/L (ref 21–32)
CREAT SERPL-MCNC: 0.95 MG/DL (ref 0.5–1.05)
EGFRCR SERPLBLD CKD-EPI 2021: 58 ML/MIN/1.73M*2
GLUCOSE BLD MANUAL STRIP-MCNC: 100 MG/DL (ref 74–99)
GLUCOSE BLD MANUAL STRIP-MCNC: 102 MG/DL (ref 74–99)
GLUCOSE BLD MANUAL STRIP-MCNC: 120 MG/DL (ref 74–99)
GLUCOSE BLD MANUAL STRIP-MCNC: 84 MG/DL (ref 74–99)
GLUCOSE BLD MANUAL STRIP-MCNC: 97 MG/DL (ref 74–99)
GLUCOSE SERPL-MCNC: 104 MG/DL (ref 74–99)
HOLD SPECIMEN: NORMAL
PHOSPHATE SERPL-MCNC: 3.1 MG/DL (ref 2.5–4.9)
POTASSIUM SERPL-SCNC: 4 MMOL/L (ref 3.5–5.3)
PROT SERPL-MCNC: 6.4 G/DL (ref 6.4–8.2)
SODIUM SERPL-SCNC: 130 MMOL/L (ref 136–145)
TRIGL SERPL-MCNC: 116 MG/DL (ref 0–149)

## 2024-06-17 PROCEDURE — 1200000002 HC GENERAL ROOM WITH TELEMETRY DAILY

## 2024-06-17 PROCEDURE — 36415 COLL VENOUS BLD VENIPUNCTURE: CPT | Performed by: INTERNAL MEDICINE

## 2024-06-17 PROCEDURE — 2500000004 HC RX 250 GENERAL PHARMACY W/ HCPCS (ALT 636 FOR OP/ED): Performed by: INTERNAL MEDICINE

## 2024-06-17 PROCEDURE — 97165 OT EVAL LOW COMPLEX 30 MIN: CPT | Mod: GO

## 2024-06-17 PROCEDURE — 84478 ASSAY OF TRIGLYCERIDES: CPT | Performed by: NURSE PRACTITIONER

## 2024-06-17 PROCEDURE — 99223 1ST HOSP IP/OBS HIGH 75: CPT | Performed by: INTERNAL MEDICINE

## 2024-06-17 PROCEDURE — 2500000004 HC RX 250 GENERAL PHARMACY W/ HCPCS (ALT 636 FOR OP/ED)

## 2024-06-17 PROCEDURE — 82040 ASSAY OF SERUM ALBUMIN: CPT | Performed by: NURSE PRACTITIONER

## 2024-06-17 PROCEDURE — 84100 ASSAY OF PHOSPHORUS: CPT | Performed by: INTERNAL MEDICINE

## 2024-06-17 PROCEDURE — 82947 ASSAY GLUCOSE BLOOD QUANT: CPT | Mod: 91

## 2024-06-17 PROCEDURE — 2500000001 HC RX 250 WO HCPCS SELF ADMINISTERED DRUGS (ALT 637 FOR MEDICARE OP): Performed by: NURSE PRACTITIONER

## 2024-06-17 RX ORDER — HYDROXYZINE HYDROCHLORIDE 10 MG/1
10 TABLET, FILM COATED ORAL ONCE
Status: COMPLETED | OUTPATIENT
Start: 2024-06-17 | End: 2024-06-17

## 2024-06-17 RX ORDER — SODIUM CHLORIDE 9 MG/ML
80 INJECTION, SOLUTION INTRAVENOUS CONTINUOUS
Status: ACTIVE | OUTPATIENT
Start: 2024-06-17 | End: 2024-06-18

## 2024-06-17 RX ADMIN — SODIUM CHLORIDE 80 ML/HR: 9 INJECTION, SOLUTION INTRAVENOUS at 23:49

## 2024-06-17 RX ADMIN — HYDROXYZINE HYDROCHLORIDE 10 MG: 10 TABLET ORAL at 16:59

## 2024-06-17 RX ADMIN — SODIUM CHLORIDE 80 ML/HR: 9 INJECTION, SOLUTION INTRAVENOUS at 12:10

## 2024-06-17 RX ADMIN — SODIUM CHLORIDE, POTASSIUM CHLORIDE, SODIUM LACTATE AND CALCIUM CHLORIDE 100 ML/HR: 600; 310; 30; 20 INJECTION, SOLUTION INTRAVENOUS at 06:33

## 2024-06-17 RX ADMIN — ENOXAPARIN SODIUM 40 MG: 40 INJECTION SUBCUTANEOUS at 21:09

## 2024-06-17 ASSESSMENT — COGNITIVE AND FUNCTIONAL STATUS - GENERAL
EATING MEALS: TOTAL
CLIMB 3 TO 5 STEPS WITH RAILING: A LITTLE
DAILY ACTIVITIY SCORE: 16
TOILETING: A LITTLE
MOBILITY SCORE: 21
HELP NEEDED FOR BATHING: A LITTLE
MOVING TO AND FROM BED TO CHAIR: A LITTLE
DAILY ACTIVITIY SCORE: 24
PERSONAL GROOMING: A LITTLE
DRESSING REGULAR UPPER BODY CLOTHING: A LITTLE
WALKING IN HOSPITAL ROOM: A LITTLE
MOVING TO AND FROM BED TO CHAIR: A LITTLE
DRESSING REGULAR LOWER BODY CLOTHING: A LITTLE
DAILY ACTIVITIY SCORE: 24
CLIMB 3 TO 5 STEPS WITH RAILING: A LITTLE
WALKING IN HOSPITAL ROOM: A LITTLE
MOBILITY SCORE: 21

## 2024-06-17 ASSESSMENT — PAIN SCALES - GENERAL
PAINLEVEL_OUTOF10: 0 - NO PAIN
PAINLEVEL_OUTOF10: 0 - NO PAIN

## 2024-06-17 ASSESSMENT — PAIN - FUNCTIONAL ASSESSMENT
PAIN_FUNCTIONAL_ASSESSMENT: 0-10
PAIN_FUNCTIONAL_ASSESSMENT: 0-10

## 2024-06-17 ASSESSMENT — ACTIVITIES OF DAILY LIVING (ADL): BATHING_ASSISTANCE: MINIMAL

## 2024-06-17 NOTE — PROGRESS NOTES
Nehal Walden is a 89 y.o. female on day 3 of admission presenting with Acute pancreatitis, unspecified complication status, unspecified pancreatitis type ([facility]).      Subjective   Seen today still stating that she has some abdominal pain and she is not able to eat as much her pressures have been elevated and she seems to have anxiety       Objective     Last Recorded Vitals  BP (!) 189/92   Pulse 68   Temp 36 °C (96.8 °F) (Temporal)   Resp 16   Wt 67 kg (147 lb 11.3 oz)   SpO2 97%   Intake/Output last 3 Shifts:    Intake/Output Summary (Last 24 hours) at 6/17/2024 1459  Last data filed at 6/17/2024 1400  Gross per 24 hour   Intake 3506.6 ml   Output --   Net 3506.6 ml       Admission Weight  Weight: 67.1 kg (148 lb) (06/14/24 1900)    Daily Weight  06/14/24 : 67 kg (147 lb 11.3 oz)    Image Results  ECG 12 lead  Sinus rhythm with sinus arrhythmia with 1st degree AV block  Otherwise normal ECG  When compared with ECG of 22-JAN-1997 15:22,  MN interval has increased  ST no longer depressed in Inferior leads    Results from last 7 days   Lab Units 06/16/24  0631   WBC AUTO x10*3/uL 9.4   HEMOGLOBIN g/dL 10.7*   HEMATOCRIT % 33.0*   PLATELETS AUTO x10*3/uL 246      Results from last 7 days   Lab Units 06/17/24  0842   SODIUM mmol/L 130*   POTASSIUM mmol/L 4.0   CHLORIDE mmol/L 102   CO2 mmol/L 19*   BUN mg/dL 9   CREATININE mg/dL 0.95   GLUCOSE mg/dL 104*   CALCIUM mg/dL 8.9      In the review of systems she complains of some abdominal cramps no vomiting or diarrhea she is not eating as much    Physical Exam  Lungs are diminished but clear heart has a regular rate and rhythm abdomen is soft is not distended or firm  Relevant Results               Assessment/Plan                  Principal Problem:    Acute pancreatitis, unspecified complication status, unspecified pancreatitis type ([facility]HS-HCC)    Acute pancreatitis   So far is resolved by the numbers she still complains of some abdominal pain she is not eating  much she is only on clear liquids need to advance her diet.  Then we need to continue and taken more orally.  I am going to ask GI on consult to take a look at her seems to be okay however need to have them reevaluate her    I am also going to put her on normal saline and reduce the rate going to correct her electrolytes a bit more and then repeat those values tomorrow along with a complete blood count she is not running a fever and she is hemodynamically stable continue all other present care and therapy thank you              Dionicio Bryant DO

## 2024-06-17 NOTE — PROGRESS NOTES
Occupational Therapy    Evaluation    Patient Name: Nehal Walden  MRN: [id number]  Today's Date: 6/17/2024  Time Calculation  Start Time: 0830  Stop Time: 0850  Time Calculation (min): 20 min        Assessment:  OT Assessment: Impaired ADLs, transfers, and mobility  Prognosis: Good  End of Session Communication: Bedside nurse  End of Session Patient Position: Bed, 2 rail up, Alarm on  OT Assessment Results: Decreased ADL status, Decreased endurance, Decreased functional mobility, Decreased IADLs  Prognosis: Good  Strengths: Premorbid level of function  Barriers to Participation: Housing layout, Insight into problems    Plan:  Treatment Interventions: ADL retraining, UE strengthening/ROM, Functional transfer training, Endurance training, Patient/family training, Equipment evaluation/education  OT Frequency: 3 times per week  OT Discharge Recommendations: Moderate intensity level of continued care  Equipment Recommended upon Discharge:  (tub seat, wheeled walker)  OT - OK to Discharge: Yes (to next level of care when medically cleared)  Treatment Interventions: ADL retraining, UE strengthening/ROM, Functional transfer training, Endurance training, Patient/family training, Equipment evaluation/education    Subjective   Current Problem:  1. Acute pancreatitis, unspecified complication status, unspecified pancreatitis type ([facility]-H[facility])          General:  General  Reason for Referral: OT eval and treat secondary to acute pancreatitis (Patient admitted with nausea, vomiting, body aches; CT: pancreatitis)  Referred By: Osvaldo  Past Medical History Relevant to Rehab: CKD, HTN, breast CA s/p L mastectomy  Prior to Session Communication: Bedside nurse  Patient Position Received: Bed, 2 rail up, Alarm on  General Comment: patient pleasant; requires some encouragement    Precautions:  LE Weight Bearing Status:  (IV, NPO)  Medical Precautions: Fall precautions       Pain:  Pain Assessment  Pain Assessment: 0-10  Pain Score: 0 - No  pain    Objective   Cognition:  Overall Cognitive Status:  (oriented x4, able to follow most commands, decreased insight/safety awareness noted - despite recent syncopal episodes, dizziness, and falls patient wanting to get OOB and ambulate on own)         Home Living:  Type of Home: House (bedroom/full bathroom upstairs; no entry steps; 1/2 bath 1st floor; reports she will sleep on couch on 1st floor if unable to negotiate stairs at discharge)  Lives With: Alone  Bathroom Shower/Tub: Tub/shower unit  Bathroom Toilet:  (standard height 1st floor; elevated toilet 2nd floor)    Prior Function:  Level of [place]: Independent with ADLs and functional transfers, Independent with homemaking with ambulation (no device used, drives, active - exercises regularly at [facility]; manages own yard work)       ADL:  Grooming Assistance: Stand by  Bathing Assistance: Minimal  UE Dressing Assistance: Stand by  LE Dressing Assistance: Minimal  Toileting Assistance with Device: Minimal    Activity Tolerance:  Endurance: Decreased tolerance for upright activites    Bed Mobility/Transfers: Bed Mobility  Bed Mobility:  (sba supine <> sit)    Transfers  Transfer:  (min assist sit to stand, cues for safety)      Functional Mobility:  Functional Mobility  Functional Mobility Performed:  (min handheld assist for mobility; adamantly refusing to use walker despite patient needing handheld assist or reaching out for furniture/wall to steady self; unsteady)    Sitting Balance:  Static Sitting Balance  Static Sitting-Level of Assistance: Independent  Dynamic Sitting Balance  Dynamic Sitting-Balance:  (sba)    Standing Balance:  Static Standing Balance  Static Standing-Level of Assistance: Minimum assistance        Vision:Vision - Basic Assessment  Current Vision: No visual deficits    Sensation:  Light Touch: No apparent deficits       Perception:  Inattention/Neglect: Appears intact    Coordination:  Movements are Fluid and Coordinated:  Yes     Hand Function:  Gross Grasp: Functional  Coordination: Functional    Extremities: RUE   RUE : Within Functional Limits    LUE   LUE: Within Functional Limits      Outcome Measures:[facility] Daily Activity  Putting on and taking off regular lower body clothing: A little  Bathing (including washing, rinsing, drying): A little  Putting on and taking off regular upper body clothing: A little  Toileting, which includes using toilet, bedpan or urinal: A little  Taking care of personal grooming such as brushing teeth: A little  Eating Meals: Total  Daily Activity - Total Score: 16        Education Documentation  Handouts, taught by Karuna Parikh OT at 6/17/2024  9:07 AM.  Learner: Patient  Readiness: Acceptance  Method: Explanation  Response: Verbalizes Understanding, Needs Reinforcement    Home Exercise Program, taught by Karuna Parikh OT at 6/17/2024  9:07 AM.  Learner: Patient  Readiness: Acceptance  Method: Explanation  Response: Verbalizes Understanding, Needs Reinforcement    ADL Training, taught by Karuna Parikh OT at 6/17/2024  9:07 AM.  Learner: Patient  Readiness: Acceptance  Method: Explanation  Response: Verbalizes Understanding, Needs Reinforcement     EDUCATION:  Education  Individual(s) Educated: Patient  Education Provided: Fall precautons, Risk and benefits of OT discussed with patient or other  Plan of Care Discussed and Agreed Upon: yes  Patient Response to Education: Patient/Caregiver Verbalized Understanding of Information    Goals:  Encounter Problems       Encounter Problems (Active)       OT Goals       STG - Patient will complete LE dressing using adaptive equip as needed with sba. (Progressing)       Start:  06/17/24    Expected End:  07/01/24            STG - Patient will complete tub/shower transfer using DME as needed with sba. (Progressing)       Start:  06/17/24    Expected End:  07/01/24            STG - Patient will complete commode, bed, and chair transfers with  supervision. (Progressing)       Start:  06/17/24    Expected End:  07/01/24            STG - Patient will complete functional mobility via device as needed with supervision. (Progressing)       Start:  06/17/24    Expected End:  07/01/24            STG - Patient will tolerate 10 minutes bilateral ue there ex using weights as tolerated to promote improved independence with ADLs and transfers.   (Progressing)       Start:  06/17/24    Expected End:  07/01/24

## 2024-06-17 NOTE — CONSULTS
"Reason For Consult  Nausea, probable gallstone    History Of Present Illness  Nehal Walden is a 89 y.o. female with past medical history significant for HTN, breast cancer s/p left mastectomy, CKD G3a, who presented to [facility] ED on 6/14 with chief complaint of abdominal pain in the setting of outpatient CTAP showing evidence of pancreatitis.  Was seen at [facility] ED on 6/8 for abdominal pain, nausea, vomiting however refused admission and left AMA.  Patient then reached out to PCP who ordered outpatient CT abdomen pelvis.      Today on exam states she \"feels fine\", denies nausea or abdominal pain, no vomiting, dysphagia or diarrhea.  Tolerates clear liquid diet well.  Appears to be very anxious, asks \"who sent you?\",  poor historian.  States \"no more tests or exams, let me die when I am ready\", repeatedly states \"no more tests, I want to do nothing\"    Reports 3-week history of \"waking up at 3 AM and vomiting\"    Laboratory data today shows glucose 104, serum sodium 130, bicarb 19.    CT of abdomen and pelvis without IV contrast demonstrated septal abnormal appearance to the pancreatic head suggesting acute pancreatitis, common duct dilation 11 mm, please see full official report for additional findings.  US gallbladder cholelithiasis, no gallbladder wall thickening or biliary dilation.      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    Surgical History  She has a past surgical history that includes Other surgical history (06/24/2021).     Social History  She reports that she has never smoked. She has never used " "smokeless tobacco. She reports current alcohol use. She reports that she does not use drugs.    Family History  No family history on file.     Allergies  Peanut and Sulfa (sulfonamide antibiotics)    Review of Systems    A 10 point review of system is negative except for what is mentioned in the HPI     Physical Exam    The note was created using voice recognition transcription software. Despite proofreading, unintentional typographical errors may be present. Please contact the GI office with any questions or concerns.     Current Medications: reviewed    Vital Signs: Reviewed    Physical Exam:  General: no apparent distress, anxious   skin:  Warm and dry, no jaundice  HEENT: No scleral icterus, no conjunctival pallor, normocephalic, atraumatic, mucous membranes moist  Neck:  atraumatic, trachea midline, no JVD  Chest:  decreased air entry to auscultation bilaterally. No wheezes, rales, or rhonchi  CV:  Regular rate and rhythm.  Positive S1/S2  Abdomen: no distension, +BS, soft, non-tender to palpation, no rebound tenderness, no guarding, no rigidity, no discernible ascites   Extremities: no lower extremity edema, Chronic pigmentary changes, no cyanosis  Neurological:  A&Ox3 , no asterixis  Psychiatric: cooperative     Investigations:  Labs, radiological imaging and cardiac work up were reviewed     Last Recorded Vitals  Blood pressure (!) 184/92, pulse 68, temperature 36 °C (96.8 °F), temperature source Temporal, resp. rate 16, height 1.65 m (5' 4.96\"), weight 67 kg (147 lb 11.3 oz), SpO2 97%.    Relevant Results      Scheduled medications  enoxaparin, 40 mg, subcutaneous, q24h  polyethylene glycol, 17 g, oral, Daily      Continuous medications  sodium chloride 0.9%, 80 mL/hr, Last Rate: 80 mL/hr (06/17/24 1210)      PRN medications  PRN medications: acetaminophen **OR** acetaminophen **OR** acetaminophen, dextrose, morphine **OR** morphine, ondansetron, ondansetron **OR** ondansetron    Results for orders placed " or performed during the hospital encounter of 06/14/24 (from the past 24 hour(s))   POCT GLUCOSE   Result Value Ref Range    POCT Glucose 94 74 - 99 mg/dL   POCT GLUCOSE   Result Value Ref Range    POCT Glucose 97 74 - 99 mg/dL   POCT GLUCOSE   Result Value Ref Range    POCT Glucose 100 (H) 74 - 99 mg/dL   Renal function panel   Result Value Ref Range    Glucose 104 (H) 74 - 99 mg/dL    Sodium 130 (L) 136 - 145 mmol/L    Potassium 4.0 3.5 - 5.3 mmol/L    Chloride 102 98 - 107 mmol/L    Bicarbonate 19 (L) 21 - 32 mmol/L    Anion Gap 13 10 - 20 mmol/L    Urea Nitrogen 9 6 - 23 mg/dL    Creatinine 0.95 0.50 - 1.05 mg/dL    eGFR 58 (L) >60 mL/min/1.73m*2    Calcium 8.9 8.6 - 10.3 mg/dL    Phosphorus 3.1 2.5 - 4.9 mg/dL    Albumin 3.9 3.4 - 5.0 g/dL   Lavender Top   Result Value Ref Range    Extra Tube Hold for add-ons.    Triglyceride   Result Value Ref Range    Triglycerides 116 0 - 149 mg/dL   POCT GLUCOSE   Result Value Ref Range    POCT Glucose 120 (H) 74 - 99 mg/dL   POCT GLUCOSE   Result Value Ref Range    POCT Glucose 84 74 - 99 mg/dL          Assessment/Plan     Nehal Walden is a 89 y.o. female with past medical history significant for HTN, breast cancer s/p left mastectomy, CKD G3a, who presented to [facility] ED on 6/14 with chief complaint of abdominal pain in the setting of outpatient CTAP showing evidence of pancreatitis.  Was seen at [facility] ED on 6/8 for abdominal pain, nausea, vomiting however refused admission and left AMA.      Was originally seen by GI on 6/15/2024, recommended serial abdominal exams, encouraged mobilization, trend LFTs, outpatient MRCP to ensure pancreatic malignancy, started CLD and signed off.    Patient denies any history of pancreatitis, history of gallstones or liver issues, denies EtOH, normal triglycerides or serum calcium elevation.  No recorded history of transaminitis.  Lipase normal    CT of abdomen and pelvis without IV contrast demonstrated subtle abnormal appearance to the  "pancreatic head suggesting acute pancreatitis, common duct dilation 11 mm, please see full official report for additional findings.  US gallbladder cholelithiasis, no gallbladder wall thickening or biliary dilation.    Given patient's advanced age and age and absence of all etiologic factors for acute pancreatitis would strongly recommend inpatient versus outpatient MRCP to rule out underlying malignancy.  The best timing would be 4 to 6 weeks after onset of acute pancreatitis which can obscure small details only imaging.    Continue daily abdominal exams, Daily LFTs.  There is a possibility of patient passing gallstones however no recorded liver enzymes elevation  Symptomatic and supportive care as per primary medicine  Advance diet as tolerated  Encourage physical activity as tolerated    Patient discussed with Dr. Garcia    I spent 60 minutes in the professional and overall care of this patient.      Nena Atwood, TAHIR-CNP  Patient seen personally by me and in detail history obtained.  I generally agree with the above findings of my nurse practitioner but I have the following additional points to be made.  This patient lives by herself and drives to see the doctors in her own car.  She has 1-2 children 1 living in [place] with whom she has very poor relationship and another disabled daughter living in [place].  The patient is very vague regarding a history and is irritated so many people have gone over the history again and again.  She told me she started with sudden abdominal pain which was severe but I could not be certain of the duration of the pain she keeps telling me that she had \"all the testing good and an MRI but is not quite sure what it was done.  The same breast she feels she is told to have anything done and does not want any serious investigations.  She denies any history of weight loss or dysphagia or any previous history of an EGD done  Physical examination shows her abdominal exam " shows no masses as such and I do not find any tenderness as a whole  Given the uncertainty in the history.  I suspect this patient had a biliary colic with a mild pancreatitis that caused the severe abdominal pain..  Obviously follow-up for workup she will require an EGD to be done.  This patient refuses adamantly and he does not want anything to be done  I did mention to her that attacks of biliary colic are unpredictable and it is possible that she may never get another attack again.  However if she were to get repeated attacks of this type of abdominal pain 1 has no choice but to suggest cholecystectomy.  Patient at the present time is not interested in any of these options and would like to be left alone.  She appears slightly depressed even though I do not notice any suicidal ideations.  Will sign out the case for the time being and be back if requested to see again.  This note was produced by Dragon dictation technology and an interest of omission commission may be present in spite of proofreading.

## 2024-06-17 NOTE — PROGRESS NOTES
"    Endocrinology Inpatient Consult Progress Note     PATIENT NAME: Nehal Walden  MRN: [id number]  DATE: 6/17/2024    CONSULTING PHYSICIAN: Dr. DEBORAH Bryant  REASON FOR CONSULT: Pre DM. Hyponatremia.      Interval Events     No acute distress.  Appropriate affect.  Hard of hearing.  Regular rate and rhythm.  Nonlabored respiration.  Patient still has nausea and abdominal pain.  She was able to tolerate some liquids by means of juice.  She does not have any vomiting.  She states her stomach still feels uncomfortable.  She is getting impatient and wants to feel better now.      Physical Examination     BP (!) 184/87 (BP Location: Right arm, Patient Position: Lying)   Pulse 68   Temp 36 °C (96.8 °F) (Temporal)   Resp 16   Ht 1.65 m (5' 4.96\")   Wt 67 kg (147 lb 11.3 oz)   SpO2 95%   BMI 24.61 kg/m²   No acute distress.  Appropriate affect.  Hard of hearing.  Regular rate and rhythm.  Nonlabored respiration  Mild abdominal tenderness diffusely.      Medications     Reviewed MAR       Data     Recent Labs and Imaging Reviewed      Assessment / Plan        # Pre Diabetes: A1c consistent with pre DM. I doubt her glucose of > 500 on her CMP two days ago was real. For now lispro SSI TID AC. Explained diagnosis to the patient.     # Acute Pancreatitis: Still not tolerating orals.  Counseled this could take some time.  Continue as needed per primary.     # Hyponatremia: I switched her IVF from 1/2 normal to LR. No repeat BMP this AM. I will order the same.         Bi Christopher, DO  Endocrinology, Diabetes, and Metabolism    Available via EPIC Messenger    Please excuse any typographical or unwanted errors within this documentation as voice recognition software was used to dictate this note.     "

## 2024-06-18 VITALS
OXYGEN SATURATION: 100 % | DIASTOLIC BLOOD PRESSURE: 90 MMHG | RESPIRATION RATE: 16 BRPM | HEIGHT: 65 IN | BODY MASS INDEX: 24.61 KG/M2 | HEART RATE: 64 BPM | SYSTOLIC BLOOD PRESSURE: 160 MMHG | TEMPERATURE: 96.6 F | WEIGHT: 147.71 LBS

## 2024-06-18 LAB
ALBUMIN SERPL BCP-MCNC: 4.1 G/DL (ref 3.4–5)
ALP SERPL-CCNC: 77 U/L (ref 33–136)
ALT SERPL W P-5'-P-CCNC: 12 U/L (ref 7–45)
ANION GAP SERPL CALC-SCNC: 13 MMOL/L (ref 10–20)
AST SERPL W P-5'-P-CCNC: 15 U/L (ref 9–39)
BILIRUB SERPL-MCNC: 0.9 MG/DL (ref 0–1.2)
BUN SERPL-MCNC: 7 MG/DL (ref 6–23)
CALCIUM SERPL-MCNC: 9.1 MG/DL (ref 8.6–10.3)
CHLORIDE SERPL-SCNC: 103 MMOL/L (ref 98–107)
CO2 SERPL-SCNC: 21 MMOL/L (ref 21–32)
CREAT SERPL-MCNC: 0.89 MG/DL (ref 0.5–1.05)
EGFRCR SERPLBLD CKD-EPI 2021: 62 ML/MIN/1.73M*2
GLUCOSE BLD MANUAL STRIP-MCNC: 94 MG/DL (ref 74–99)
GLUCOSE BLD MANUAL STRIP-MCNC: 95 MG/DL (ref 74–99)
GLUCOSE SERPL-MCNC: 91 MG/DL (ref 74–99)
HOLD SPECIMEN: NORMAL
POTASSIUM SERPL-SCNC: 3.6 MMOL/L (ref 3.5–5.3)
PROT SERPL-MCNC: 6.7 G/DL (ref 6.4–8.2)
SODIUM SERPL-SCNC: 133 MMOL/L (ref 136–145)

## 2024-06-18 PROCEDURE — 36415 COLL VENOUS BLD VENIPUNCTURE: CPT | Performed by: NURSE PRACTITIONER

## 2024-06-18 PROCEDURE — 82947 ASSAY GLUCOSE BLOOD QUANT: CPT | Mod: 91

## 2024-06-18 PROCEDURE — 84075 ASSAY ALKALINE PHOSPHATASE: CPT | Performed by: NURSE PRACTITIONER

## 2024-06-18 SDOH — ECONOMIC STABILITY: FOOD INSECURITY: WITHIN THE PAST 12 MONTHS, YOU WORRIED THAT YOUR FOOD WOULD RUN OUT BEFORE YOU GOT MONEY TO BUY MORE.: NEVER TRUE

## 2024-06-18 SDOH — SOCIAL STABILITY: SOCIAL NETWORK: IN A TYPICAL WEEK, HOW MANY TIMES DO YOU TALK ON THE PHONE WITH FAMILY, FRIENDS, OR NEIGHBORS?: ONCE A WEEK

## 2024-06-18 SDOH — SOCIAL STABILITY: SOCIAL NETWORK: HOW OFTEN DO YOU GET TOGETHER WITH FRIENDS OR RELATIVES?: THREE TIMES A WEEK

## 2024-06-18 SDOH — ECONOMIC STABILITY: HOUSING INSECURITY
IN THE LAST 12 MONTHS, WAS THERE A TIME WHEN YOU DID NOT HAVE A STEADY PLACE TO SLEEP OR SLEPT IN A SHELTER (INCLUDING NOW)?: NO

## 2024-06-18 SDOH — SOCIAL STABILITY: SOCIAL NETWORK: HOW OFTEN DO YOU ATTEND CHURCH OR RELIGIOUS SERVICES?: NEVER

## 2024-06-18 SDOH — SOCIAL STABILITY: SOCIAL NETWORK
DO YOU BELONG TO ANY CLUBS OR ORGANIZATIONS SUCH AS CHURCH GROUPS UNIONS, FRATERNAL OR ATHLETIC GROUPS, OR SCHOOL GROUPS?: NO

## 2024-06-18 SDOH — ECONOMIC STABILITY: FOOD INSECURITY: WITHIN THE PAST 12 MONTHS, THE FOOD YOU BOUGHT JUST DIDN'T LAST AND YOU DIDN'T HAVE MONEY TO GET MORE.: NEVER TRUE

## 2024-06-18 SDOH — HEALTH STABILITY: PHYSICAL HEALTH: ON AVERAGE, HOW MANY DAYS PER WEEK DO YOU ENGAGE IN MODERATE TO STRENUOUS EXERCISE (LIKE A BRISK WALK)?: 3 DAYS

## 2024-06-18 SDOH — HEALTH STABILITY: MENTAL HEALTH
STRESS IS WHEN SOMEONE FEELS TENSE, NERVOUS, ANXIOUS, OR CAN'T SLEEP AT NIGHT BECAUSE THEIR MIND IS TROUBLED. HOW STRESSED ARE YOU?: TO SOME EXTENT

## 2024-06-18 SDOH — HEALTH STABILITY: PHYSICAL HEALTH: ON AVERAGE, HOW MANY MINUTES DO YOU ENGAGE IN EXERCISE AT THIS LEVEL?: 50 MIN

## 2024-06-18 SDOH — SOCIAL STABILITY: SOCIAL NETWORK: HOW OFTEN DO YOU ATTENT MEETINGS OF THE CLUB OR ORGANIZATION YOU BELONG TO?: NEVER

## 2024-06-18 ASSESSMENT — COGNITIVE AND FUNCTIONAL STATUS - GENERAL
MOBILITY SCORE: 24
DAILY ACTIVITIY SCORE: 24

## 2024-06-18 ASSESSMENT — ACTIVITIES OF DAILY LIVING (ADL): LACK_OF_TRANSPORTATION: NO

## 2024-06-18 NOTE — PROGRESS NOTES
"    Endocrinology Inpatient Consult Progress Note     PATIENT NAME: Nehal Walden  MRN: [id number]  DATE: 6/18/2024    CONSULTING PHYSICIAN: Dr. DEBORAH Bryant  REASON FOR CONSULT: Pre DM. Hyponatremia.      Interval Events     Patient seen this.    She was in her liquid diet.  She was eating Jell-O.  She feels her nausea and abdominal pain are better.  She keeps asked me go home.  Told her the decision was not up to me..      Physical Examination     BP (!) 182/92   Pulse 77   Temp 36.4 °C (97.5 °F)   Resp 16   Ht 1.65 m (5' 4.96\")   Wt 67 kg (147 lb 11.3 oz)   SpO2 97%   BMI 24.61 kg/m²   No acute distress.  Appropriate affect.  Hard of hearing.  Regular rate and rhythm.  Nonlabored respiration  Mild abdominal tenderness diffusely.      Medications     Reviewed MAR       Data     Recent Labs and Imaging Reviewed      Assessment / Plan        # Pre Diabetes: A1c consistent with pre DM. I doubt her glucose of > 500 on her CMP two days ago was real. For now lispro SSI TID AC. Explained diagnosis to the patient.     # Acute Pancreatitis: GI now following.     # Hyponatremia: Now managed by primary. Switched to NS. Like third spacing in the setting of the acute pancreatitis. Sodium did not improve with LR.    We will sign off.  Please let us know if we can help in any way.         Bi Christopher, DO  Endocrinology, Diabetes, and Metabolism    Available via EPIC Messenger    Please excuse any typographical or unwanted errors within this documentation as voice recognition software was used to dictate this note.     "

## 2024-06-18 NOTE — PROGRESS NOTES
Nehal Walden is a 89 y.o. female on day 4 of admission presenting with Acute pancreatitis, unspecified complication status, unspecified pancreatitis type (HHS-HCC).    Patient seen and evaluated today by myself and Dr. Bryant.  Patient medically cleared for discharge.  Patient feeling better and states abdominal pain is resolving.  States she was able to tolerate some food as well.  Patient will need to follow-up with gastroenterology in 4 to 6 weeks for outpatient MRCP.  Will also follow-up with PCP.  Patient seen by endocrinology here, hemoglobin A1c was 6.2, which is consistent with pre- diabetes mellitus.  Diagnosis discussed with patient by Dr. Christopher.  Patient will be educated to start on diabetic/carb count diet.    Vitals stable this morning.  Blood pressure still elevated, patient will be restarted on home hydralazine which she states she has not been taking.  Sodium level improved since admission.      Zev Morris PA-C

## 2024-06-18 NOTE — DISCHARGE SUMMARY
Discharge Diagnosis  Acute pancreatitis, unspecified complication status, unspecified pancreatitis type (HHS-HCC)    Issues Requiring Follow-Up      Discharge Meds     Your medication list        START taking these medications        Instructions Last Dose Given Next Dose Due   hydrALAZINE 25 mg tablet  Commonly known as: Apresoline      Take 1 tablet (25 mg) by mouth 2 times a day.              CONTINUE taking these medications        Instructions Last Dose Given Next Dose Due   ondansetron 4 mg tablet  Commonly known as: Zofran      Take 1 tablet (4 mg) by mouth every 8 hours if needed for nausea or vomiting for up to 7 days.                Test Results Pending At Discharge  Pending Labs       No current pending labs.            Hospital Course   89 y.o. female with past medical history significant for HTN, breast cancer s/p left mastectomy, CKD G3a, who presents to [facility] ED on 6/14 with chief complaint of abdominal pain in the setting of outpatient CTAP showing evidence of pancreatitis.  Briefly, patient was seen at [facility] ED on 6/8 for abdominal pain, nausea, vomiting however refused admission and left AMA.  Patient then reached out to PCP who ordered outpatient CT abdomen pelvis.  She currently denies abdominal pain, nausea, vomiting, stating that the Zofran alleviated symptoms.  She states she has not been able to keep anything down since symptoms began a few days ago.  She endorses associated weakness and dizziness.  Denies chest pain, diaphoresis.  CODE STATUS discussed and patient elects to be a CCA.   She had an acute pancreatitis probably from biliary stone that passed her nausea resolved and her lipase went back to normal her liver enzymes were normal and she was eating on the day of her discharge she felt much better she wanted to leave she was walking around decided I talked with her about it and stated that she could go home went ahead and discharged her in stable condition discharged the management  greater than 30 minutes total time thank you    Pertinent Physical Exam At Time of Discharge  Physical Exam    Outpatient Follow-Up  Future Appointments   Date Time Provider Department Center   8/16/2024 10:00 AM Ana Laura ESTEVEZ DO [id number] Mk Bryant DO

## 2024-06-18 NOTE — PROGRESS NOTES
"   24 1030   Discharge Planning   Number of Stairs to Enter Residence 0   Number of Stairs Within Residence 13   Do you have animals or pets at home? No   Does the patient need discharge transport arranged? Yes   RoundTrip coordination needed? Yes   Has discharge transport been arranged? No   Financial Resource Strain   How hard is it for you to pay for the very basics like food, housing, medical care, and heating? Somewhat   Housing Stability   In the last 12 months, was there a time when you were not able to pay the mortgage or rent on time? N   Transportation Needs   In the past 12 months, has lack of transportation kept you from medical appointments or from getting medications? no   In the past 12 months, has lack of transportation kept you from meetings, work, or from getting things needed for daily living? No     Called in to patients room and identified myself.  Confirmed name, , address, insurance and PCP. Lives alone. Does not want her family called. \" I can take care of myself\".  Uses no DME. Continues to drive. Her car is at the [facility] [employer] [facility]. She will need a taxie or uber to there at discharge. Discussed C needs for therapy. Dos not want anything. She will call her PCP if she has any difficulties when she gets home. She is concerned bout paying all her bills and taxes.   notified need for community agencies to assist her.   "

## 2024-06-20 ENCOUNTER — PATIENT OUTREACH (OUTPATIENT)
Dept: PRIMARY CARE | Facility: CLINIC | Age: 89
End: 2024-06-20
Payer: COMMERCIAL

## 2024-06-20 ENCOUNTER — TELEPHONE (OUTPATIENT)
Dept: PRIMARY CARE | Facility: CLINIC | Age: 89
End: 2024-06-20

## 2024-06-20 LAB
ATRIAL RATE: 65 BPM
P AXIS: 76 DEGREES
P OFFSET: 155 MS
P ONSET: 94 MS
PR INTERVAL: 262 MS
Q ONSET: 225 MS
QRS COUNT: 11 BEATS
QRS DURATION: 82 MS
QT INTERVAL: 402 MS
QTC CALCULATION(BAZETT): 418 MS
QTC FREDERICIA: 412 MS
R AXIS: 30 DEGREES
T AXIS: 29 DEGREES
T OFFSET: 426 MS
VENTRICULAR RATE: 65 BPM

## 2024-06-20 NOTE — TELEPHONE ENCOUNTER
----- Message from Tiffani Gupta CMA sent at 6/20/2024 12:46 PM EDT -----  Regarding: tcm  Discharge facility: [facility]  Discharge diagnosis:   Acute pancreatitis,  Date of discharge:      6/18/2024  Unsuccessful attempts x2 to reach patient for PCP Follow-up  Please have office staff reach out to patient and schedule an appointment within 14 days from discharge date on or before 7/1/2024 for TCM billing

## 2024-06-20 NOTE — TELEPHONE ENCOUNTER
----- Message from Tiffani Gupta CMA sent at 6/20/2024 12:46 PM EDT -----  Regarding: tcm  Discharge facility: [facility]  Discharge diagnosis:   Acute pancreatitis,  Date of discharge:      6/18/2024  Unsuccessful attempts x2 to reach patient for PCP Follow-up  Please have office staff reach out to patient and schedule an appointment within 14 days from discharge date on or before 7/1/2024 for TCM billing

## 2024-06-20 NOTE — PROGRESS NOTES
Discharge Facility: [facility]  Discharge Diagnosis: Acute pancreatitis,  Admission Date: 6/14/2024  Discharge Date: 6/18/2024    PCP Appointment Date: none  Specialist Appointment Date:   -Call Asim Garcia in 1 month(s) call to schedule outpatient MRCP in 4-6 weeks.     Hospital Encounter and Summary: Linked     Two attempts were made to reach patient within two business days after discharge. Voicemail left with contact information for patient to call back with any non-emergent questions or concerns.

## 2024-07-02 ENCOUNTER — PATIENT OUTREACH (OUTPATIENT)
Dept: PRIMARY CARE | Facility: CLINIC | Age: 89
End: 2024-07-02
Payer: COMMERCIAL

## 2024-07-05 ENCOUNTER — APPOINTMENT (OUTPATIENT)
Dept: PRIMARY CARE | Facility: CLINIC | Age: 89
End: 2024-07-05
Payer: COMMERCIAL

## 2024-07-05 VITALS
HEART RATE: 80 BPM | SYSTOLIC BLOOD PRESSURE: 146 MMHG | WEIGHT: 146.8 LBS | BODY MASS INDEX: 24.46 KG/M2 | DIASTOLIC BLOOD PRESSURE: 70 MMHG

## 2024-07-05 DIAGNOSIS — N18.31 STAGE 3A CHRONIC KIDNEY DISEASE (MULTI): ICD-10-CM

## 2024-07-05 DIAGNOSIS — K80.20 CALCULUS OF GALLBLADDER WITHOUT CHOLECYSTITIS WITHOUT OBSTRUCTION: ICD-10-CM

## 2024-07-05 DIAGNOSIS — Z09 HOSPITAL DISCHARGE FOLLOW-UP: Primary | ICD-10-CM

## 2024-07-05 DIAGNOSIS — K85.90 ACUTE PANCREATITIS, UNSPECIFIED COMPLICATION STATUS, UNSPECIFIED PANCREATITIS TYPE (HHS-HCC): ICD-10-CM

## 2024-07-05 DIAGNOSIS — I10 BENIGN ESSENTIAL HYPERTENSION: ICD-10-CM

## 2024-07-05 PROCEDURE — 1159F MED LIST DOCD IN RCRD: CPT | Performed by: INTERNAL MEDICINE

## 2024-07-05 PROCEDURE — 3077F SYST BP >= 140 MM HG: CPT | Performed by: INTERNAL MEDICINE

## 2024-07-05 PROCEDURE — 99214 OFFICE O/P EST MOD 30 MIN: CPT | Performed by: INTERNAL MEDICINE

## 2024-07-05 PROCEDURE — 1157F ADVNC CARE PLAN IN RCRD: CPT | Performed by: INTERNAL MEDICINE

## 2024-07-05 PROCEDURE — 3078F DIAST BP <80 MM HG: CPT | Performed by: INTERNAL MEDICINE

## 2024-07-05 PROCEDURE — 1111F DSCHRG MED/CURRENT MED MERGE: CPT | Performed by: INTERNAL MEDICINE

## 2024-07-05 RX ORDER — HYDRALAZINE HYDROCHLORIDE 25 MG/1
25 TABLET, FILM COATED ORAL NIGHTLY
Status: SHIPPED
Start: 2024-07-05 | End: 2025-01-01

## 2024-07-05 ASSESSMENT — ENCOUNTER SYMPTOMS
ANAL BLEEDING: 0
ABDOMINAL DISTENTION: 0
VOMITING: 0
HEADACHES: 0
UNEXPECTED WEIGHT CHANGE: 0
CONSTIPATION: 0
FATIGUE: 0
APPETITE CHANGE: 0
PALPITATIONS: 0
BLOOD IN STOOL: 0
WHEEZING: 0
DIARRHEA: 0
CHEST TIGHTNESS: 0
LIGHT-HEADEDNESS: 0
FEVER: 0
WEAKNESS: 1
COUGH: 0
SHORTNESS OF BREATH: 0
CHILLS: 0
RECTAL PAIN: 0
DIZZINESS: 0
NAUSEA: 0
ABDOMINAL PAIN: 1
ACTIVITY CHANGE: 0
DIAPHORESIS: 0

## 2024-07-05 NOTE — ASSESSMENT & PLAN NOTE
Improved on only once a day hydralazine  Will continue for now and monitor BP  She has her physical in 1 month

## 2024-07-05 NOTE — ASSESSMENT & PLAN NOTE
Still has some tenderness to mid abdomen on palpation but has improved with appetite and no longer having nausea/vomiting   Secondary to gallstone noted on ultrasound   She will continue to increase diet as able

## 2024-07-05 NOTE — PROGRESS NOTES
Subjective   Patient ID: Nehal Walden is a 89 y.o. female who presents for Hospital Follow-up.    HPI  Pt here in follow up to hospital stay-she was admitted 6/14-6/18 for acute pancreatitis.  She had ultrasound of gallbladder that showed a stone.  She is past the 14 day transition of care time frame.      Since coming home she is now better.  She is no longer having nausea, or vomiting.  Her appetite is improving.  She still is weak a bit.  She has some tenderness to the abdomen still.      She is only taking Hydralazine 25 mg and only one a day.  She takes this at night to make sure she doesn't feel poor after use.  Her BP is much improved.      Review of Systems   Constitutional:  Negative for activity change, appetite change, chills, diaphoresis, fatigue, fever and unexpected weight change.   Respiratory:  Negative for cough, chest tightness, shortness of breath and wheezing.    Cardiovascular:  Negative for chest pain, palpitations and leg swelling.   Gastrointestinal:  Positive for abdominal pain. Negative for abdominal distention, anal bleeding, blood in stool, constipation, diarrhea, nausea, rectal pain and vomiting.   Neurological:  Positive for weakness. Negative for dizziness, light-headedness and headaches.       Objective   /70 (BP Location: Right arm, Patient Position: Sitting)   Pulse 80   Wt 66.6 kg (146 lb 12.8 oz)   BMI 24.46 kg/m²    Physical Exam  Constitutional:       Appearance: Normal appearance.   Cardiovascular:      Rate and Rhythm: Normal rate and regular rhythm.      Heart sounds: Normal heart sounds.   Pulmonary:      Effort: Pulmonary effort is normal.      Breath sounds: Normal breath sounds.   Abdominal:      General: There is no distension.      Palpations: There is no mass.      Tenderness: There is abdominal tenderness. There is no right CVA tenderness, left CVA tenderness, guarding or rebound.      Hernia: No hernia is present.   Neurological:      Mental Status: She is  alert and oriented to person, place, and time.   Psychiatric:         Mood and Affect: Mood normal.         Assessment/Plan   Problem List Items Addressed This Visit       Benign essential hypertension     Improved on only once a day hydralazine  Will continue for now and monitor BP  She has her physical in 1 month          Relevant Medications    hydrALAZINE (Apresoline) 25 mg tablet    Other Relevant Orders    Follow Up In Primary Care - Medicare Annual    Stage 3a chronic kidney disease (Multi)    Relevant Orders    Follow Up In Primary Care - Medicare Annual    Acute pancreatitis, unspecified complication status, unspecified pancreatitis type (HHS-HCC)     Still has some tenderness to mid abdomen on palpation but has improved with appetite and no longer having nausea/vomiting   Secondary to gallstone noted on ultrasound   She will continue to increase diet as able           Calculus of gallbladder without cholecystitis without obstruction     Other Visit Diagnoses       Hospital discharge follow-up    -  Primary

## 2024-07-05 NOTE — PATIENT INSTRUCTIONS
Continue the blood pressure medication-one pill at day in evening  Continue to increase diet as able  Once you feel your strength has improved can restart exercise as able to tolerate  Follow up here in 3 months for full physical

## 2024-07-11 ENCOUNTER — TELEPHONE (OUTPATIENT)
Dept: PRIMARY CARE | Facility: CLINIC | Age: 89
End: 2024-07-11
Payer: COMMERCIAL

## 2024-07-11 ENCOUNTER — APPOINTMENT (OUTPATIENT)
Dept: RADIOLOGY | Facility: HOSPITAL | Age: 89
DRG: 064 | End: 2024-07-11
Payer: MEDICARE

## 2024-07-11 ENCOUNTER — APPOINTMENT (OUTPATIENT)
Dept: CARDIOLOGY | Facility: HOSPITAL | Age: 89
DRG: 064 | End: 2024-07-11
Payer: MEDICARE

## 2024-07-11 ENCOUNTER — HOSPITAL ENCOUNTER (INPATIENT)
Facility: HOSPITAL | Age: 89
DRG: 064 | End: 2024-07-11
Attending: EMERGENCY MEDICINE | Admitting: INTERNAL MEDICINE
Payer: MEDICARE

## 2024-07-11 DIAGNOSIS — I60.9 SAH (SUBARACHNOID HEMORRHAGE) (MULTI): Primary | ICD-10-CM

## 2024-07-11 DIAGNOSIS — R29.898 LEFT ARM WEAKNESS: ICD-10-CM

## 2024-07-11 DIAGNOSIS — Z51.5 HOSPICE CARE: ICD-10-CM

## 2024-07-11 LAB
ALBUMIN SERPL BCP-MCNC: 4.9 G/DL (ref 3.4–5)
ALP SERPL-CCNC: 72 U/L (ref 33–136)
ALT SERPL W P-5'-P-CCNC: 16 U/L (ref 7–45)
ANION GAP SERPL CALC-SCNC: 13 MMOL/L (ref 10–20)
APTT PPP: 34 SECONDS (ref 27–38)
AST SERPL W P-5'-P-CCNC: 20 U/L (ref 9–39)
BASOPHILS # BLD AUTO: 0.07 X10*3/UL (ref 0–0.1)
BASOPHILS NFR BLD AUTO: 1.2 %
BILIRUB SERPL-MCNC: 0.6 MG/DL (ref 0–1.2)
BNP SERPL-MCNC: 85 PG/ML (ref 0–99)
BUN SERPL-MCNC: 19 MG/DL (ref 6–23)
CALCIUM SERPL-MCNC: 10 MG/DL (ref 8.6–10.3)
CARDIAC TROPONIN I PNL SERPL HS: 4 NG/L (ref 0–13)
CARDIAC TROPONIN I PNL SERPL HS: 5 NG/L (ref 0–13)
CHLORIDE SERPL-SCNC: 100 MMOL/L (ref 98–107)
CHOLEST SERPL-MCNC: 227 MG/DL (ref 0–199)
CHOLESTEROL/HDL RATIO: 4.4
CO2 SERPL-SCNC: 26 MMOL/L (ref 21–32)
CREAT SERPL-MCNC: 1.24 MG/DL (ref 0.5–1.05)
EGFRCR SERPLBLD CKD-EPI 2021: 42 ML/MIN/1.73M*2
EOSINOPHIL # BLD AUTO: 0.17 X10*3/UL (ref 0–0.4)
EOSINOPHIL NFR BLD AUTO: 2.9 %
ERYTHROCYTE [DISTWIDTH] IN BLOOD BY AUTOMATED COUNT: 15 % (ref 11.5–14.5)
GLUCOSE BLD MANUAL STRIP-MCNC: 160 MG/DL (ref 74–99)
GLUCOSE SERPL-MCNC: 100 MG/DL (ref 74–99)
HCT VFR BLD AUTO: 39.5 % (ref 36–46)
HDLC SERPL-MCNC: 51.1 MG/DL
HGB BLD-MCNC: 13.4 G/DL (ref 12–16)
IMM GRANULOCYTES # BLD AUTO: 0.01 X10*3/UL (ref 0–0.5)
IMM GRANULOCYTES NFR BLD AUTO: 0.2 % (ref 0–0.9)
INR PPP: 1.1 (ref 0.9–1.1)
LDLC SERPL CALC-MCNC: 160 MG/DL
LYMPHOCYTES # BLD AUTO: 1.76 X10*3/UL (ref 0.8–3)
LYMPHOCYTES NFR BLD AUTO: 29.8 %
MAGNESIUM SERPL-MCNC: 2.21 MG/DL (ref 1.6–2.4)
MCH RBC QN AUTO: 29.1 PG (ref 26–34)
MCHC RBC AUTO-ENTMCNC: 33.9 G/DL (ref 32–36)
MCV RBC AUTO: 86 FL (ref 80–100)
MONOCYTES # BLD AUTO: 0.85 X10*3/UL (ref 0.05–0.8)
MONOCYTES NFR BLD AUTO: 14.4 %
NEUTROPHILS # BLD AUTO: 3.05 X10*3/UL (ref 1.6–5.5)
NEUTROPHILS NFR BLD AUTO: 51.5 %
NON HDL CHOLESTEROL: 176 MG/DL (ref 0–149)
NRBC BLD-RTO: 0 /100 WBCS (ref 0–0)
PLATELET # BLD AUTO: 273 X10*3/UL (ref 150–450)
POTASSIUM SERPL-SCNC: 4.4 MMOL/L (ref 3.5–5.3)
PROT SERPL-MCNC: 8 G/DL (ref 6.4–8.2)
PROTHROMBIN TIME: 12.3 SECONDS (ref 9.8–12.8)
RBC # BLD AUTO: 4.61 X10*6/UL (ref 4–5.2)
SODIUM SERPL-SCNC: 135 MMOL/L (ref 136–145)
TRIGL SERPL-MCNC: 78 MG/DL (ref 0–149)
VLDL: 16 MG/DL (ref 0–40)
WBC # BLD AUTO: 5.9 X10*3/UL (ref 4.4–11.3)

## 2024-07-11 PROCEDURE — 70450 CT HEAD/BRAIN W/O DYE: CPT

## 2024-07-11 PROCEDURE — 2500000004 HC RX 250 GENERAL PHARMACY W/ HCPCS (ALT 636 FOR OP/ED): Performed by: EMERGENCY MEDICINE

## 2024-07-11 PROCEDURE — 84484 ASSAY OF TROPONIN QUANT: CPT | Performed by: NURSE PRACTITIONER

## 2024-07-11 PROCEDURE — 85610 PROTHROMBIN TIME: CPT | Performed by: INTERNAL MEDICINE

## 2024-07-11 PROCEDURE — 83735 ASSAY OF MAGNESIUM: CPT | Performed by: NURSE PRACTITIONER

## 2024-07-11 PROCEDURE — 85025 COMPLETE CBC W/AUTO DIFF WBC: CPT | Performed by: NURSE PRACTITIONER

## 2024-07-11 PROCEDURE — 71046 X-RAY EXAM CHEST 2 VIEWS: CPT

## 2024-07-11 PROCEDURE — 71046 X-RAY EXAM CHEST 2 VIEWS: CPT | Mod: FOREIGN READ | Performed by: RADIOLOGY

## 2024-07-11 PROCEDURE — 99291 CRITICAL CARE FIRST HOUR: CPT | Performed by: EMERGENCY MEDICINE

## 2024-07-11 PROCEDURE — 36415 COLL VENOUS BLD VENIPUNCTURE: CPT | Performed by: NURSE PRACTITIONER

## 2024-07-11 PROCEDURE — 82947 ASSAY GLUCOSE BLOOD QUANT: CPT

## 2024-07-11 PROCEDURE — 80061 LIPID PANEL: CPT | Performed by: INTERNAL MEDICINE

## 2024-07-11 PROCEDURE — 93005 ELECTROCARDIOGRAM TRACING: CPT

## 2024-07-11 PROCEDURE — 2500000004 HC RX 250 GENERAL PHARMACY W/ HCPCS (ALT 636 FOR OP/ED): Performed by: INTERNAL MEDICINE

## 2024-07-11 PROCEDURE — 85730 THROMBOPLASTIN TIME PARTIAL: CPT | Performed by: INTERNAL MEDICINE

## 2024-07-11 PROCEDURE — 2020000001 HC ICU ROOM DAILY

## 2024-07-11 PROCEDURE — 83880 ASSAY OF NATRIURETIC PEPTIDE: CPT | Performed by: NURSE PRACTITIONER

## 2024-07-11 PROCEDURE — 72125 CT NECK SPINE W/O DYE: CPT | Performed by: RADIOLOGY

## 2024-07-11 PROCEDURE — 84484 ASSAY OF TROPONIN QUANT: CPT | Performed by: INTERNAL MEDICINE

## 2024-07-11 PROCEDURE — 96374 THER/PROPH/DIAG INJ IV PUSH: CPT

## 2024-07-11 PROCEDURE — 72125 CT NECK SPINE W/O DYE: CPT

## 2024-07-11 PROCEDURE — 70450 CT HEAD/BRAIN W/O DYE: CPT | Performed by: RADIOLOGY

## 2024-07-11 PROCEDURE — 36415 COLL VENOUS BLD VENIPUNCTURE: CPT | Performed by: INTERNAL MEDICINE

## 2024-07-11 PROCEDURE — 80053 COMPREHEN METABOLIC PANEL: CPT | Performed by: NURSE PRACTITIONER

## 2024-07-11 PROCEDURE — 2500000005 HC RX 250 GENERAL PHARMACY W/O HCPCS: Performed by: INTERNAL MEDICINE

## 2024-07-11 RX ORDER — PANTOPRAZOLE SODIUM 40 MG/10ML
40 INJECTION, POWDER, LYOPHILIZED, FOR SOLUTION INTRAVENOUS
Status: DISCONTINUED | OUTPATIENT
Start: 2024-07-12 | End: 2024-07-15 | Stop reason: HOSPADM

## 2024-07-11 RX ORDER — LABETALOL HYDROCHLORIDE 5 MG/ML
10 INJECTION, SOLUTION INTRAVENOUS EVERY 10 MIN PRN
Status: DISPENSED | OUTPATIENT
Start: 2024-07-11 | End: 2024-07-13

## 2024-07-11 RX ORDER — NICARDIPINE HYDROCHLORIDE 0.2 MG/ML
1-15 INJECTION INTRAVENOUS CONTINUOUS PRN
Status: DISCONTINUED | OUTPATIENT
Start: 2024-07-11 | End: 2024-07-12

## 2024-07-11 RX ORDER — HYDRALAZINE HYDROCHLORIDE 20 MG/ML
10 INJECTION INTRAMUSCULAR; INTRAVENOUS
Status: ACTIVE | OUTPATIENT
Start: 2024-07-11 | End: 2024-07-13

## 2024-07-11 RX ORDER — NICARDIPINE HYDROCHLORIDE 0.2 MG/ML
1-15 INJECTION INTRAVENOUS CONTINUOUS PRN
Status: CANCELLED | OUTPATIENT
Start: 2024-07-11

## 2024-07-11 RX ORDER — HYDRALAZINE HYDROCHLORIDE 20 MG/ML
10 INJECTION INTRAMUSCULAR; INTRAVENOUS
Status: CANCELLED | OUTPATIENT
Start: 2024-07-11 | End: 2024-07-13

## 2024-07-11 RX ORDER — LABETALOL HYDROCHLORIDE 5 MG/ML
10 INJECTION, SOLUTION INTRAVENOUS EVERY 10 MIN PRN
Status: CANCELLED | OUTPATIENT
Start: 2024-07-11 | End: 2024-07-13

## 2024-07-11 RX ORDER — DEXTROSE 50 % IN WATER (D50W) INTRAVENOUS SYRINGE
12.5
Status: DISCONTINUED | OUTPATIENT
Start: 2024-07-11 | End: 2024-07-15

## 2024-07-11 RX ORDER — NICARDIPINE HYDROCHLORIDE 0.2 MG/ML
2.5-15 INJECTION INTRAVENOUS CONTINUOUS
Status: DISCONTINUED | OUTPATIENT
Start: 2024-07-11 | End: 2024-07-15

## 2024-07-11 RX ORDER — HYDRALAZINE HYDROCHLORIDE 25 MG/1
25 TABLET, FILM COATED ORAL NIGHTLY
Status: DISCONTINUED | OUTPATIENT
Start: 2024-07-11 | End: 2024-07-15 | Stop reason: HOSPADM

## 2024-07-11 RX ORDER — DEXTROSE 50 % IN WATER (D50W) INTRAVENOUS SYRINGE
25
Status: DISCONTINUED | OUTPATIENT
Start: 2024-07-11 | End: 2024-07-15

## 2024-07-11 RX ORDER — SODIUM CHLORIDE 9 MG/ML
125 INJECTION, SOLUTION INTRAVENOUS CONTINUOUS
Status: DISCONTINUED | OUTPATIENT
Start: 2024-07-11 | End: 2024-07-15 | Stop reason: HOSPADM

## 2024-07-11 RX ORDER — HALOPERIDOL 5 MG/ML
0.5 INJECTION INTRAMUSCULAR EVERY 4 HOURS PRN
Status: DISCONTINUED | OUTPATIENT
Start: 2024-07-11 | End: 2024-07-15 | Stop reason: HOSPADM

## 2024-07-11 RX ORDER — AMLODIPINE BESYLATE 5 MG/1
2.5 TABLET ORAL DAILY
Status: DISCONTINUED | OUTPATIENT
Start: 2024-07-12 | End: 2024-07-15 | Stop reason: HOSPADM

## 2024-07-11 RX ORDER — ONDANSETRON HYDROCHLORIDE 2 MG/ML
4 INJECTION, SOLUTION INTRAVENOUS EVERY 6 HOURS PRN
Status: DISCONTINUED | OUTPATIENT
Start: 2024-07-11 | End: 2024-07-15 | Stop reason: HOSPADM

## 2024-07-11 RX ORDER — HYDRALAZINE HYDROCHLORIDE 20 MG/ML
10 INJECTION INTRAMUSCULAR; INTRAVENOUS EVERY 4 HOURS PRN
Status: DISCONTINUED | OUTPATIENT
Start: 2024-07-11 | End: 2024-07-15 | Stop reason: HOSPADM

## 2024-07-11 ASSESSMENT — LIFESTYLE VARIABLES
HAVE PEOPLE ANNOYED YOU BY CRITICIZING YOUR DRINKING: NO
EVER HAD A DRINK FIRST THING IN THE MORNING TO STEADY YOUR NERVES TO GET RID OF A HANGOVER: NO
HAVE YOU EVER FELT YOU SHOULD CUT DOWN ON YOUR DRINKING: NO
TOTAL SCORE: 0
EVER FELT BAD OR GUILTY ABOUT YOUR DRINKING: NO

## 2024-07-11 ASSESSMENT — ENCOUNTER SYMPTOMS
APPETITE CHANGE: 0
WEAKNESS: 1
SHORTNESS OF BREATH: 0
FEVER: 0
CHILLS: 0
ARTHRALGIAS: 0
COLOR CHANGE: 0
RHINORRHEA: 0
FACIAL ASYMMETRY: 1
COUGH: 0
VOMITING: 0
CONFUSION: 1
NUMBNESS: 1
EYES NEGATIVE: 1
MYALGIAS: 0
BACK PAIN: 0
ABDOMINAL DISTENTION: 0
ACTIVITY CHANGE: 1
SINUS PAIN: 0
DIZZINESS: 0
CHEST TIGHTNESS: 0
HEADACHES: 0
SORE THROAT: 0
ABDOMINAL PAIN: 0
SPEECH DIFFICULTY: 1
FATIGUE: 0
HEMATOLOGIC/LYMPHATIC NEGATIVE: 1
NAUSEA: 0

## 2024-07-11 NOTE — ED TRIAGE NOTES
Secondary to patient volumes and overcrowding, I performed a brief medical screening exam of the patient in triage, as the patient awaits space in the main ED.    History of Present Illness:  Nehal Walden presents with   Chief Complaint   Patient presents with    Numbness       Physical Exam:  General - In no acute distress  Respiratory - Breathing comfortably  Cardiac - Normal S1, S2, no m/g/r  Neuro - No focal neurologic deficits. Cranial nerves normal as tested from III through XII.  Bilateral upper and lower extremity strengths intact 5/5, sensation intact, DTRs 2+, no drift.  No neglect, no dysarthria, no word finding abnormality.   Eyes - EOMI, PERRL.    Medical Decision Making:  Patient will require further evaluation in the main ED.    Initial diagnostic tests were ordered from triage.    The patient demonstrates understanding that this initial evaluation is a brief medical screening exam and the expectation is that they await for space in the main ED to be further evaluated.  The patient understands that, if they leave prior to further evaluation in the main ED after this initial evaluation in triage, they are doing so under their own accord knowing that their evaluation/work-up is not yet complete. The patient also understands that any preliminary diagnostic results, including abnormalities, may not be shared with them, if they choose to leave prior to further evaluation in the main ED.

## 2024-07-11 NOTE — TELEPHONE ENCOUNTER
Nehal called today because she is having shortness of breath and her neighbor is concerned because she is not finding the words to speak, she is having left arm pain and numbness, she states she keeps dropping things in her left hand, this all started yesterday.  I advised her she needs to go to ER today.

## 2024-07-12 ENCOUNTER — APPOINTMENT (OUTPATIENT)
Dept: RADIOLOGY | Facility: HOSPITAL | Age: 89
DRG: 064 | End: 2024-07-12
Payer: MEDICARE

## 2024-07-12 ENCOUNTER — APPOINTMENT (OUTPATIENT)
Dept: CARDIOLOGY | Facility: HOSPITAL | Age: 89
DRG: 064 | End: 2024-07-12
Payer: MEDICARE

## 2024-07-12 LAB
ALBUMIN SERPL BCP-MCNC: 4.2 G/DL (ref 3.4–5)
ALP SERPL-CCNC: 56 U/L (ref 33–136)
ALT SERPL W P-5'-P-CCNC: 13 U/L (ref 7–45)
ANION GAP SERPL CALC-SCNC: 12 MMOL/L (ref 10–20)
AST SERPL W P-5'-P-CCNC: 16 U/L (ref 9–39)
ATRIAL RATE: 72 BPM
BASOPHILS # BLD AUTO: 0.03 X10*3/UL (ref 0–0.1)
BASOPHILS NFR BLD AUTO: 0.4 %
BILIRUB SERPL-MCNC: 0.5 MG/DL (ref 0–1.2)
BUN SERPL-MCNC: 21 MG/DL (ref 6–23)
CALCIUM SERPL-MCNC: 8.8 MG/DL (ref 8.6–10.3)
CHLORIDE SERPL-SCNC: 105 MMOL/L (ref 98–107)
CO2 SERPL-SCNC: 21 MMOL/L (ref 21–32)
CREAT SERPL-MCNC: 1.07 MG/DL (ref 0.5–1.05)
EGFRCR SERPLBLD CKD-EPI 2021: 50 ML/MIN/1.73M*2
EOSINOPHIL # BLD AUTO: 0.02 X10*3/UL (ref 0–0.4)
EOSINOPHIL NFR BLD AUTO: 0.3 %
ERYTHROCYTE [DISTWIDTH] IN BLOOD BY AUTOMATED COUNT: 14.7 % (ref 11.5–14.5)
GLUCOSE BLD MANUAL STRIP-MCNC: 127 MG/DL (ref 74–99)
GLUCOSE BLD MANUAL STRIP-MCNC: 134 MG/DL (ref 74–99)
GLUCOSE SERPL-MCNC: 136 MG/DL (ref 74–99)
HCT VFR BLD AUTO: 32.6 % (ref 36–46)
HGB BLD-MCNC: 11.1 G/DL (ref 12–16)
IMM GRANULOCYTES # BLD AUTO: 0.03 X10*3/UL (ref 0–0.5)
IMM GRANULOCYTES NFR BLD AUTO: 0.4 % (ref 0–0.9)
LYMPHOCYTES # BLD AUTO: 0.85 X10*3/UL (ref 0.8–3)
LYMPHOCYTES NFR BLD AUTO: 12.1 %
MAGNESIUM SERPL-MCNC: 1.93 MG/DL (ref 1.6–2.4)
MCH RBC QN AUTO: 29.1 PG (ref 26–34)
MCHC RBC AUTO-ENTMCNC: 34 G/DL (ref 32–36)
MCV RBC AUTO: 86 FL (ref 80–100)
MONOCYTES # BLD AUTO: 0.56 X10*3/UL (ref 0.05–0.8)
MONOCYTES NFR BLD AUTO: 8 %
NEUTROPHILS # BLD AUTO: 5.53 X10*3/UL (ref 1.6–5.5)
NEUTROPHILS NFR BLD AUTO: 78.8 %
NRBC BLD-RTO: 0 /100 WBCS (ref 0–0)
P AXIS: 70 DEGREES
P OFFSET: 155 MS
P ONSET: 106 MS
PHOSPHATE SERPL-MCNC: 3.4 MG/DL (ref 2.5–4.9)
PLATELET # BLD AUTO: 202 X10*3/UL (ref 150–450)
POTASSIUM SERPL-SCNC: 4 MMOL/L (ref 3.5–5.3)
PR INTERVAL: 238 MS
PROT SERPL-MCNC: 6.8 G/DL (ref 6.4–8.2)
Q ONSET: 225 MS
QRS COUNT: 12 BEATS
QRS DURATION: 74 MS
QT INTERVAL: 398 MS
QTC CALCULATION(BAZETT): 435 MS
QTC FREDERICIA: 423 MS
R AXIS: -8 DEGREES
RBC # BLD AUTO: 3.81 X10*6/UL (ref 4–5.2)
SODIUM SERPL-SCNC: 134 MMOL/L (ref 136–145)
T AXIS: 19 DEGREES
T OFFSET: 424 MS
VENTRICULAR RATE: 72 BPM
WBC # BLD AUTO: 7 X10*3/UL (ref 4.4–11.3)

## 2024-07-12 PROCEDURE — 82947 ASSAY GLUCOSE BLOOD QUANT: CPT

## 2024-07-12 PROCEDURE — 93010 ELECTROCARDIOGRAM REPORT: CPT | Performed by: INTERNAL MEDICINE

## 2024-07-12 PROCEDURE — 80053 COMPREHEN METABOLIC PANEL: CPT | Performed by: INTERNAL MEDICINE

## 2024-07-12 PROCEDURE — 70450 CT HEAD/BRAIN W/O DYE: CPT | Performed by: RADIOLOGY

## 2024-07-12 PROCEDURE — 2500000002 HC RX 250 W HCPCS SELF ADMINISTERED DRUGS (ALT 637 FOR MEDICARE OP, ALT 636 FOR OP/ED): Performed by: INTERNAL MEDICINE

## 2024-07-12 PROCEDURE — 93005 ELECTROCARDIOGRAM TRACING: CPT

## 2024-07-12 PROCEDURE — 85025 COMPLETE CBC W/AUTO DIFF WBC: CPT | Performed by: INTERNAL MEDICINE

## 2024-07-12 PROCEDURE — 83735 ASSAY OF MAGNESIUM: CPT | Performed by: INTERNAL MEDICINE

## 2024-07-12 PROCEDURE — 70450 CT HEAD/BRAIN W/O DYE: CPT

## 2024-07-12 PROCEDURE — 99291 CRITICAL CARE FIRST HOUR: CPT

## 2024-07-12 PROCEDURE — 2580000001 HC RX 258 IV SOLUTIONS: Performed by: INTERNAL MEDICINE

## 2024-07-12 PROCEDURE — 2500000004 HC RX 250 GENERAL PHARMACY W/ HCPCS (ALT 636 FOR OP/ED): Performed by: INTERNAL MEDICINE

## 2024-07-12 PROCEDURE — 84100 ASSAY OF PHOSPHORUS: CPT | Performed by: INTERNAL MEDICINE

## 2024-07-12 PROCEDURE — 99221 1ST HOSP IP/OBS SF/LOW 40: CPT | Performed by: NURSE PRACTITIONER

## 2024-07-12 PROCEDURE — 2020000001 HC ICU ROOM DAILY

## 2024-07-12 PROCEDURE — C9113 INJ PANTOPRAZOLE SODIUM, VIA: HCPCS | Performed by: INTERNAL MEDICINE

## 2024-07-12 SDOH — SOCIAL STABILITY: SOCIAL INSECURITY: ARE YOU OR HAVE YOU BEEN THREATENED OR ABUSED PHYSICALLY, EMOTIONALLY, OR SEXUALLY BY ANYONE?: NO

## 2024-07-12 SDOH — SOCIAL STABILITY: SOCIAL INSECURITY: DO YOU FEEL UNSAFE GOING BACK TO THE PLACE WHERE YOU ARE LIVING?: NO

## 2024-07-12 SDOH — SOCIAL STABILITY: SOCIAL INSECURITY: HAVE YOU HAD ANY THOUGHTS OF HARMING ANYONE ELSE?: NO

## 2024-07-12 SDOH — SOCIAL STABILITY: SOCIAL INSECURITY: ABUSE: ADULT

## 2024-07-12 SDOH — SOCIAL STABILITY: SOCIAL INSECURITY: HAS ANYONE EVER THREATENED TO HURT YOUR FAMILY OR YOUR PETS?: NO

## 2024-07-12 SDOH — SOCIAL STABILITY: SOCIAL INSECURITY: DOES ANYONE TRY TO KEEP YOU FROM HAVING/CONTACTING OTHER FRIENDS OR DOING THINGS OUTSIDE YOUR HOME?: NO

## 2024-07-12 SDOH — SOCIAL STABILITY: SOCIAL INSECURITY: HAVE YOU HAD THOUGHTS OF HARMING ANYONE ELSE?: NO

## 2024-07-12 SDOH — SOCIAL STABILITY: SOCIAL INSECURITY: DO YOU FEEL ANYONE HAS EXPLOITED OR TAKEN ADVANTAGE OF YOU FINANCIALLY OR OF YOUR PERSONAL PROPERTY?: NO

## 2024-07-12 SDOH — SOCIAL STABILITY: SOCIAL INSECURITY: ARE THERE ANY APPARENT SIGNS OF INJURIES/BEHAVIORS THAT COULD BE RELATED TO ABUSE/NEGLECT?: NO

## 2024-07-12 ASSESSMENT — COGNITIVE AND FUNCTIONAL STATUS - GENERAL
EATING MEALS: TOTAL
MOBILITY SCORE: 8
DAILY ACTIVITIY SCORE: 10
MOVING TO AND FROM BED TO CHAIR: TOTAL
TURNING FROM BACK TO SIDE WHILE IN FLAT BAD: A LOT
WALKING IN HOSPITAL ROOM: TOTAL
TOILETING: A LOT
DRESSING REGULAR LOWER BODY CLOTHING: TOTAL
MOVING FROM LYING ON BACK TO SITTING ON SIDE OF FLAT BED WITH BEDRAILS: A LOT
HELP NEEDED FOR BATHING: A LOT
PERSONAL GROOMING: A LITTLE
PATIENT BASELINE BEDBOUND: NO
STANDING UP FROM CHAIR USING ARMS: TOTAL
DRESSING REGULAR UPPER BODY CLOTHING: TOTAL
CLIMB 3 TO 5 STEPS WITH RAILING: TOTAL

## 2024-07-12 ASSESSMENT — PAIN - FUNCTIONAL ASSESSMENT: PAIN_FUNCTIONAL_ASSESSMENT: CPOT (CRITICAL CARE PAIN OBSERVATION TOOL)

## 2024-07-12 ASSESSMENT — ACTIVITIES OF DAILY LIVING (ADL)
DRESSING YOURSELF: NEEDS ASSISTANCE
BATHING: NEEDS ASSISTANCE
HEARING - RIGHT EAR: FUNCTIONAL
TOILETING: NEEDS ASSISTANCE
HEARING - LEFT EAR: FUNCTIONAL
LACK_OF_TRANSPORTATION: PATIENT UNABLE TO ANSWER
ADEQUATE_TO_COMPLETE_ADL: UNABLE TO ASSESS
WALKS IN HOME: UNABLE TO ASSESS
FEEDING YOURSELF: UNABLE TO ASSESS
ASSISTIVE_DEVICE: EYEGLASSES
GROOMING: NEEDS ASSISTANCE
PATIENT'S MEMORY ADEQUATE TO SAFELY COMPLETE DAILY ACTIVITIES?: UNABLE TO ASSESS
JUDGMENT_ADEQUATE_SAFELY_COMPLETE_DAILY_ACTIVITIES: UNABLE TO ASSESS

## 2024-07-12 ASSESSMENT — PATIENT HEALTH QUESTIONNAIRE - PHQ9
1. LITTLE INTEREST OR PLEASURE IN DOING THINGS: NOT AT ALL
2. FEELING DOWN, DEPRESSED OR HOPELESS: NOT AT ALL
SUM OF ALL RESPONSES TO PHQ9 QUESTIONS 1 & 2: 0

## 2024-07-12 ASSESSMENT — LIFESTYLE VARIABLES
HOW OFTEN DO YOU HAVE A DRINK CONTAINING ALCOHOL: NEVER
AUDIT-C TOTAL SCORE: 0
HOW MANY STANDARD DRINKS CONTAINING ALCOHOL DO YOU HAVE ON A TYPICAL DAY: PATIENT DOES NOT DRINK
SUBSTANCE_ABUSE_PAST_12_MONTHS: NO
AUDIT-C TOTAL SCORE: 0
PRESCIPTION_ABUSE_PAST_12_MONTHS: NO
SKIP TO QUESTIONS 9-10: 1
HOW OFTEN DO YOU HAVE 6 OR MORE DRINKS ON ONE OCCASION: NEVER

## 2024-07-12 NOTE — PROCEDURES
"Arterial Puncture/Cannulation    Date/Time: 7/11/2024 11:00 PM    Performed by: Deedee Yang DO  Authorized by: Deedee Yang DO  Consent: The procedure was performed in an emergent situation. Verbal consent obtained. Written consent not obtained.  Risks and benefits: risks, benefits and alternatives were discussed  Consent given by: patient  Patient states understanding of procedure being performed: Partially.  Patient consent: the patient's understanding of the procedure matches consent given  Procedure consent: procedure consent matches procedure scheduled  Relevant documents: relevant documents present and verified  Test results: test results available and properly labeled  Site marked: the operative site was marked  Imaging studies: imaging studies available  Required items: required blood products, implants, devices, and special equipment available  Patient identity confirmed: arm band  Time out: Immediately prior to procedure a \"time out\" was called to verify the correct patient, procedure, equipment, support staff and site/side marked as required.  Preparation: Patient was prepped and draped in the usual sterile fashion.  Local anesthesia used: yes  Anesthesia: local infiltration    Anesthesia:  Local anesthesia used: yes  Local Anesthetic: lidocaine 1% without epinephrine  Anesthetic total: 1 mL    Sedation:  Patient sedated: no    Patient tolerance: patient tolerated the procedure well with no immediate complications  Comments: Patient initially expressed understanding and agreed to the procedure, however she forgot quickly what was occurring and needed to be reminded repeatedly.  The procedure will be done under emergent consent at this time.              "

## 2024-07-12 NOTE — H&P
History Of Present Illness  Nehal Walden is a 89 y.o. female presenting with left arm numbness and pain which began approximately 24 hours ago.  She also stated she had decreased dexterity of that left hand and was having issues talking.  While waiting for evaluation she did develop flaccid paralysis of the left upper extremity and was emergently evaluated for stroke.  CAT scan did find a small subarachnoid hemorrhage.  Initially her blood pressure was under 140 systolically but subsequently it did go up to 230 and was resistant to the initial Cardene.  She has been seen in the emergency department.  At this time she is denying any nausea or vomiting.  She is denying any pain.  She seems somewhat confused and seems to have difficulty understanding me.  She provided me with the phone number of her granddaughter who is on her way into the hospital now.  She does have a left facial droop and while she can move her shoulder and has some lateral movement of the left hand for the most part her left arm is flaccid.  She is able to move her left leg but that worsens over time as well.  She denies any history of stroke.  And she does have some aphasia.  With her aphasia most of her medical history is obtained from the chart..     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 She has never used smokeless tobacco. She reports current alcohol use. She reports that she does not use drugs.    Family History  Non contributory     Allergies  Peanut and Sulfa (sulfonamide antibiotics)    Review of Systems   Constitutional:  Positive for activity change. Negative for appetite change, chills, fatigue and fever.   HENT:  Negative for rhinorrhea, sinus pain and sore throat.    Eyes: Negative.    Respiratory:  Negative for cough, chest tightness and shortness of breath.    Cardiovascular:  Negative for chest pain and leg swelling.   Gastrointestinal:  Negative for abdominal distention, abdominal pain, nausea and vomiting.   Genitourinary: Negative.    Musculoskeletal:  Negative for arthralgias, back pain and myalgias.   Skin:  Negative for color change.   Neurological:  Positive for facial asymmetry, speech difficulty, weakness and numbness. Negative for dizziness and headaches.   Hematological: Negative.    Psychiatric/Behavioral:  Positive for confusion.         Physical Exam  Constitutional:       Comments: L facial droop   HENT:      Head: Normocephalic and atraumatic.      Right Ear: External ear normal.      Left Ear: External ear normal.      Nose: Nose normal.      Mouth/Throat:      Mouth: Mucous membranes are moist.      Pharynx: Oropharynx is clear.   Eyes:      Extraocular Movements: Extraocular movements intact.      Conjunctiva/sclera: Conjunctivae normal.      Pupils: Pupils are equal, round, and reactive to light.   Cardiovascular:      Rate and Rhythm: Normal rate and regular rhythm.      Pulses: Normal pulses.      Heart sounds:      No friction rub. No gallop.   Pulmonary:      Effort: Pulmonary effort is normal.      Breath sounds: Normal breath sounds. No wheezing, rhonchi or rales.   Abdominal:      General: Abdomen is flat.  "Bowel sounds are normal. There is no distension.      Palpations: Abdomen is soft.      Tenderness: There is no abdominal tenderness. There is no guarding.   Musculoskeletal:         General: No swelling, tenderness or deformity.      Cervical back: Normal range of motion and neck supple.      Comments: Limited movement of L arm, developing more weakness in LLE     Skin:     General: Skin is warm and dry.      Capillary Refill: Capillary refill takes less than 2 seconds.      Findings: Bruising present. No rash.   Neurological:      Mental Status: She is alert and oriented to person, place, and time.      Comments: See NIHHS, LUE 2/5, LLE 4/5, sensation intact, L facial droop with some slurred speech     Psychiatric:         Mood and Affect: Mood normal.         Behavior: Behavior normal.          Last Recorded Vitals  Blood pressure 146/70, pulse 75, temperature 36.8 °C (98.2 °F), resp. rate 23, height 1.626 m (5' 4\"), weight 63.5 kg (140 lb), SpO2 94%.    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    - q1 neuro checks  - neurosurgery on board, reviewed imaging - patient doesn't want surgery even if indicated.  - coags wnl, no OAC at home  - start norvasc in am and continue home hydralazine if able to take PO    Has had intolerance of multiple antihypertensive agents in the past.  - NPO until cleared by swallow eval  - ICH score 1    2. Acute renal injury  - she appears dry - will give gentle IV hydration  - avoid nephrotoxins, monitor UOP    3. HPL  - not on statin at home at this time, will start when able to take PO    4. H/o " hyperglycemia  - will order glucose checks and low dose SSI    5.  GI/DVT ppx  - protonix, SCDs  - heparin contraindicated with the ICH    Family arrived and was updated at bedside, they confirm the DNR/DNI.     Deedee Yang DO    45 minutes Non procedural Critical Care time spent

## 2024-07-12 NOTE — CONSULTS
"Social Work Note  The Remote LSW received a Hospice referral from the Unit RN is Secure chat this date. The Unit RN was able to provide a grandtr's contact information, Kaia Delaney [phone]. The LSW contacted the grandtr who is stating is the family contact in that the pt is  with 2 adult children: the son not wanting involvement and the grandtr's mother, the pt's adult dtr, in \"no state of mind\" to make health care decisions at this time. The grandtr states she is an assisted living RN and has had good experiences with [facility] as the hospice agency of choice. A referral with the Hospice order has been sent in [facility] to R for review, acceptance and meeting arrangements. Awaiting the HWR response to the referral.  "

## 2024-07-12 NOTE — CARE PLAN
Problem: General Stroke  Goal: Demonstrate improvement in neurological exam throughout the shift  Outcome: Not Progressing     Problem: ICU Stroke  Goal: Achieve/maintain targeted sodium level throughout shift  Outcome: Not Progressing     Problem: General Stroke  Goal: Establish a mutual long term goal with patient by discharge  Outcome: Progressing  Goal: Maintain BP within ordered limits throughout shift  Outcome: Progressing  Goal: Participate in treatment (ie., meds, therapy) throughout shift  Outcome: Progressing  Goal: No symptoms of aspiration throughout shift  Outcome: Progressing  Goal: No symptoms of hemorrhage throughout shift  Outcome: Progressing  Goal: Decreased nausea/vomiting throughout shift  Outcome: Progressing  Goal: Controlled blood glucose throughout shift  Outcome: Progressing     Problem: ICU Stroke  Goal: Maintain patent airway throughout shift  Outcome: Progressing   The patient's goals for the shift include  reposition q2h to prevent pressure injury/promote comfort    The clinical goals for the shift include  maintain systolic bp less than 140    Over the shift, the patient did not make progress toward the following goals. Barriers to progression include progression of bleed. Recommendations to address these barriers include continue to monitor and control blood pressure, head elevated.    Problem: General Stroke  Goal: Demonstrate improvement in neurological exam throughout the shift  Outcome: Not Progressing     Problem: ICU Stroke  Goal: Achieve/maintain targeted sodium level throughout shift  Outcome: Not Progressing

## 2024-07-12 NOTE — PROGRESS NOTES
07/12/24 1212   Discharge Planning   Living Arrangements Alone   Support Systems Family members   Assistance Needed Independent   Type of Residence Private residence   Expected Discharge Disposition Hospice   Financial Resource Strain   How hard is it for you to pay for the very basics like food, housing, medical care, and heating? Pt Unable   Housing Stability   In the last 12 months, was there a time when you were not able to pay the mortgage or rent on time? Pt Unable   In the past 12 months, how many times have you moved where you were living? 1   At any time in the past 12 months, were you homeless or living in a shelter (including now)? Pt Unable   Transportation Needs   In the past 12 months, has lack of transportation kept you from medical appointments or from getting medications? Pt Unable   In the past 12 months, has lack of transportation kept you from meetings, work, or from getting things needed for daily living? Pt Unable     Rounding with ICU, discussion with family. Awaiting Neurosurgery, if interventions to be done. Patient DNR/DNI. Possible GOC/palliative care. SW updated.     DIANNE OLIVARES RN [facility]

## 2024-07-12 NOTE — CONSULTS
"Social Work Note  The Remote LSW received a Hospice referral from the Unit RN in Secure chat this date. The Unit RN was able to provide a grandtr's contact information, Kaia Delaney [phone]. The LSW contacted the grandtr who is stating is the family contact in that the pt is  with 2 adult children: the son not wanting involvement and the grandtr's mother, the pt's adult dtr, in \"no state of mind\" to make health care decisions at this time. The grandtr states she is an assisted living RN and has had good experiences with [facility] as the hospice agency of choice. A referral with the Hospice order has been sent in [facility]port to R for review, acceptance and meeting arrangements. Awaiting the HWR response to the referral.  "

## 2024-07-12 NOTE — ED PROVIDER NOTES
HPI   Chief Complaint   Patient presents with    Numbness       Patient is an 89-year-old female, medical history significant for prior breast cancer, hypertension, presents to the emergency department with left arm numbness and pain.  Patient symptoms began over 24 hours ago.  She presented today because she felt like she was having a hard time getting her words out and had clumsiness of the left hand.  While in triage, waiting for a bed, the patient's symptoms did acutely worsen to where now she had flaccid paralysis of the left upper extremity.  She denies any head injury or fall.                          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  Social History     Tobacco Use    Smoking status: Never    Smokeless tobacco: Never   Vaping Use    Vaping status: Never Used   Substance Use Topics    Alcohol use: Yes     Comment: rarely drinks wine    Drug use: Never       Physical Exam   ED Triage Vitals [07/11/24 1749]   Temperature Heart Rate Respirations BP   36 °C (96.8 °F) 72 (!) 77 --      Pulse Ox Temp src Heart Rate Source Patient Position   97 % -- -- --      BP Location FiO2 (%)     -- --       Physical Exam  Vitals and  nursing note reviewed.   Constitutional:       General: She is not in acute distress.     Appearance: She is well-developed.   HENT:      Head: Normocephalic and atraumatic.   Eyes:      Conjunctiva/sclera: Conjunctivae normal.   Cardiovascular:      Rate and Rhythm: Normal rate and regular rhythm.      Heart sounds: No murmur heard.  Pulmonary:      Effort: Pulmonary effort is normal. No respiratory distress.      Breath sounds: Normal breath sounds.   Abdominal:      Palpations: Abdomen is soft.      Tenderness: There is no abdominal tenderness.   Musculoskeletal:         General: No swelling.      Cervical back: Neck supple.   Skin:     General: Skin is warm and dry.      Capillary Refill: Capillary refill takes less than 2 seconds.   Neurological:      Mental Status: She is alert and oriented to person, place, and time.      Sensory: Sensory deficit present.      Motor: Weakness present.      Coordination: Coordination abnormal.   Psychiatric:         Mood and Affect: Mood normal.         ED Course & [facility]   ED Course as of 07/11/24 2141   u Jul 11, 2024 2015 Patient had change in status while in the waiting room, she started developing headache and left-sided flaccidity.  She was brought immediately back to the ED for evaluation. [WS]   2046 CT does demonstrate mild right frontal lobe subarachnoid hemorrhage. [MK]   2104 CBC unremarkable. [MK]   2126 Chemistry panel demonstrates mild renal insufficiency.  Magnesium normal.  Troponin and BNP normal. [MK]   2126 Chest x-ray shows no acute process. [MK]      ED Course User Index  [MK] Dong Barrow MD  [WS] Boaz Sims, APRN-CNP         Diagnoses as of 07/11/24 2141   SAH (subarachnoid hemorrhage) (Multi)   Left arm weakness       Medical Decision Making  Medical Decision Making: Patient was not activated as a stroke team as her symptoms been greater than 24 hours.  However, she did have an acute change and was taken immediately to CT.  Patient's NIH on  arrival is 10.  CT does demonstrate evidence of small subarachnoid hemorrhage within the right frontal lobe.  I discussed this with the patient.  She states that she would never want surgery or intervention.  I also discussed the patient with neurosurgery who recommended repeat 6-hour head CT, and also agreed with Cardene drip.  At this point, the patient's systolic blood pressure was 137 CardioNet did not have to be started.    EKG interpreted by myself (ED attending physician): Sinus rhythm.  First-degree AV block.  Occasional PAC.  No acute ischemia.  No STEMI.    Differential Diagnoses Considered: Acute stroke, paresthesia, intracranial hemorrhage    Chronic Medical Conditions Significantly Affecting Care: Hypertension    External Records Reviewed: I reviewed recent and relevant outside records including: Outpatient medication reconciliation    Independent Interpretation of Studies:  I independently interpreted: CTs obtained and reviewed    Escalation of Care:  Appropriate for acute hospitalization    Social Determinants of Health Significantly Affecting Care:  No social determinants    Prescription Drug Consideration: Cardene as needed    Diagnostic testing considered: Noncontributory    Discussion of Management with Other Providers:   I discussed the patient/results with: Admitting provider and neurosurgery          Procedure  Critical Care    Performed by: Dong Barrow MD  Authorized by: Dong Barrow MD    Critical care provider statement:     Critical care time (minutes):  35    Critical care time was exclusive of:  Separately billable procedures and treating other patients and teaching time    Critical care was necessary to treat or prevent imminent or life-threatening deterioration of the following conditions:  CNS failure or compromise    Critical care was time spent personally by me on the following activities:  Ordering and performing treatments and interventions, ordering and review of  laboratory studies, ordering and review of radiographic studies, re-evaluation of patient's condition, review of old charts, examination of patient, discussions with primary provider, discussions with consultants and evaluation of patient's response to treatment    Care discussed with: admitting provider         Dong Barrow MD  07/11/24 5962

## 2024-07-12 NOTE — PROGRESS NOTES
Dr Alfredo with neurosurgery notified of CT findings.  Patient's neuro exam has been stable since ICU arrival.  He agrees with current plan, no role for surgery at this time.  Will continue with BP management and recheck CT head in 6 hours.

## 2024-07-12 NOTE — CONSULTS
Reason For Consult  SAH    History Of Present Illness  Nehal Walden is an 89 y.o. female presenting to [facility] ED on 07/11/2024, with LUE paresthesia, expressive aphasia. In ED, LUE became flaccid. CT Head imaging, to my interpretation, demonstrated right frontal SAH; repeat CT Head images reveal progression of hemorrhage to ICH with vasogenic edema. Repeat CT Head has been requested. Currently, she is resting in bed.    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    Surgical History  She has a past surgical history that includes Other surgical history (06/24/2021).     Social History  She reports that she has never smoked. She has never used smokeless tobacco. She reports current alcohol use. She reports that she does not use drugs.    Family History  No family history on file.     Allergies  Peanut and Sulfa (sulfonamide antibiotics)    Medications:  Scheduled medications  amiodarone, 150 mg, intravenous, Once  amLODIPine, 2.5 mg, oral, Daily  hydrALAZINE, 25 mg, oral, Nightly  insulin regular, 0-5 Units, subcutaneous, q4h  oxygen, , inhalation, Continuous - Inhalation  pantoprazole, 40 mg, intravenous, Daily before breakfast  sodium chloride, 500 mL, intravenous, Once      Continuous medications  amiodarone, 0.5-1 mg/min  niCARdipine, 2.5-15 mg/hr, Last Rate: 5 mg/hr (07/12/24 0330)  niCARdipine, 1-15 mg/hr  sodium chloride 0.9%, 125 mL/hr, Last Rate: 125 mL/hr (07/12/24 0515)      PRN  "medications  PRN medications: dextrose, dextrose, glucagon, glucagon, haloperidol lactate, hydrALAZINE, hydrALAZINE, labetaloL, niCARdipine, ondansetron      Review of Systems  ROS x 10 is not available due to aphasia     Physical Exam  Well nourished, well developed elderly female, resting in bed  Opens eyes to voice, Follows some simple commands  PERRL, rightward deviation of eyes, mild left facial droop  Unable to perform pronator drift testing due to aphasia, LUE flaccidity, finger to nose - not available on left  Skin is warm / dry, no obvious lesions on exposed skin   Thorax is midline; chest expansion is symmetric  Moves RUE, RLE. LUE is flaccid, but shakes her head yes to sensory testing, Moves LLE with noxious stim  Abdomen is not distended  Urine - catheter in place       Last Recorded Vitals  Blood pressure 128/73, pulse 79, temperature 36.8 °C (98.2 °F), resp. rate 19, height 1.626 m (5' 4\"), weight 65 kg (143 lb 4.8 oz), SpO2 97%.    Relevant Results  Results for orders placed or performed during the hospital encounter of 07/11/24 (from the past 24 hour(s))   B-Type Natriuretic Peptide   Result Value Ref Range    BNP 85 0 - 99 pg/mL   Magnesium   Result Value Ref Range    Magnesium 2.21 1.60 - 2.40 mg/dL   Comprehensive Metabolic Panel   Result Value Ref Range    Glucose 100 (H) 74 - 99 mg/dL    Sodium 135 (L) 136 - 145 mmol/L    Potassium 4.4 3.5 - 5.3 mmol/L    Chloride 100 98 - 107 mmol/L    Bicarbonate 26 21 - 32 mmol/L    Anion Gap 13 10 - 20 mmol/L    Urea Nitrogen 19 6 - 23 mg/dL    Creatinine 1.24 (H) 0.50 - 1.05 mg/dL    eGFR 42 (L) >60 mL/min/1.73m*2    Calcium 10.0 8.6 - 10.3 mg/dL    Albumin 4.9 3.4 - 5.0 g/dL    Alkaline Phosphatase 72 33 - 136 U/L    Total Protein 8.0 6.4 - 8.2 g/dL    AST 20 9 - 39 U/L    Bilirubin, Total 0.6 0.0 - 1.2 mg/dL    ALT 16 7 - 45 U/L   CBC and Auto Differential   Result Value Ref Range    WBC 5.9 4.4 - 11.3 x10*3/uL    nRBC 0.0 0.0 - 0.0 /100 WBCs    RBC 4.61 " 4.00 - 5.20 x10*6/uL    Hemoglobin 13.4 12.0 - 16.0 g/dL    Hematocrit 39.5 36.0 - 46.0 %    MCV 86 80 - 100 fL    MCH 29.1 26.0 - 34.0 pg    MCHC 33.9 32.0 - 36.0 g/dL    RDW 15.0 (H) 11.5 - 14.5 %    Platelets 273 150 - 450 x10*3/uL    Neutrophils % 51.5 40.0 - 80.0 %    Immature Granulocytes %, Automated 0.2 0.0 - 0.9 %    Lymphocytes % 29.8 13.0 - 44.0 %    Monocytes % 14.4 2.0 - 10.0 %    Eosinophils % 2.9 0.0 - 6.0 %    Basophils % 1.2 0.0 - 2.0 %    Neutrophils Absolute 3.05 1.60 - 5.50 x10*3/uL    Immature Granulocytes Absolute, Automated 0.01 0.00 - 0.50 x10*3/uL    Lymphocytes Absolute 1.76 0.80 - 3.00 x10*3/uL    Monocytes Absolute 0.85 (H) 0.05 - 0.80 x10*3/uL    Eosinophils Absolute 0.17 0.00 - 0.40 x10*3/uL    Basophils Absolute 0.07 0.00 - 0.10 x10*3/uL   Troponin I, High Sensitivity, Initial   Result Value Ref Range    Troponin I, High Sensitivity 5 0 - 13 ng/L   Lipid Panel   Result Value Ref Range    Cholesterol 227 (H) 0 - 199 mg/dL    HDL-Cholesterol 51.1 mg/dL    Cholesterol/HDL Ratio 4.4     LDL Calculated 160 (H) <=99 mg/dL    VLDL 16 0 - 40 mg/dL    Triglycerides 78 0 - 149 mg/dL    Non HDL Cholesterol 176 (H) 0 - 149 mg/dL   Troponin I, High Sensitivity   Result Value Ref Range    Troponin I, High Sensitivity 4 0 - 13 ng/L   Protime-INR   Result Value Ref Range    Protime 12.3 9.8 - 12.8 seconds    INR 1.1 0.9 - 1.1   aPTT   Result Value Ref Range    aPTT 34 27 - 38 seconds   POCT GLUCOSE   Result Value Ref Range    POCT Glucose 160 (H) 74 - 99 mg/dL          Assessment/Plan   89 yo female with progression of right frontal hemorrhagic infarct  Discussed with Dr. Gerber Alfredo: non-surgical   - Await repeat CT Head  - Continue medical management with frequent neuro checks, SBP < 140, HOB > 30 degrees, avoid anticoagulation at this time  - Discussed at bedside with RN    I spent >35 minutes in the professional and overall care of this patient.      Payton Rojas, APRN-CNP

## 2024-07-12 NOTE — PROGRESS NOTES
Nehal Walden is a 89 y.o. female on day 1 of admission presenting with SAH (subarachnoid hemorrhage) (Multi).      Subjective   Patient seen and examined at bedside.  Very drowsy.  Family responsive.  Does respond to some commands.  Patient has significant weakness on the left side of her body including flaccid paralysis of the left upper extremity.  There is slight left facial droop.  She is unable to speak.       Objective     Last Recorded Vitals  /73   Pulse 78   Temp 36.3 °C (97.3 °F) (Temporal)   Resp 17   Wt 65 kg (143 lb 4.8 oz)   SpO2 97%   Intake/Output last 3 Shifts:    Intake/Output Summary (Last 24 hours) at 7/12/2024 1427  Last data filed at 7/12/2024 0749  Gross per 24 hour   Intake 1553.69 ml   Output 650 ml   Net 903.69 ml       Admission Weight  Weight: 63.5 kg (140 lb) (07/11/24 1749)    Daily Weight  07/11/24 : 65 kg (143 lb 4.8 oz)    Image Results  CT head wo IV contrast  Narrative: Interpreted By:  Bobby Lacey,   STUDY:  CT HEAD WO IV CONTRAST;  7/12/2024 8:52 am      INDICATION:  Signs/Symptoms:ICH.      COMPARISON:  Head CT date 07/12/2024 at 2:20 a.m.      ACCESSION NUMBER(S):  [id number]      ORDERING CLINICIAN:  DIANNE ALEXANDER      TECHNIQUE:  Noncontrast axial CT scan of head was performed. Angled reformats in  brain and bone windows were generated 07/12/2024 at 8:50 a.m.. The  images were reviewed in bone, brain, blood and soft tissue windows.      FINDINGS:  Parenchyma: There remains parenchymal hemorrhage in the right mid  superior frontal lobe. There has been reviewed distribution of  hyperdensity to the inferior margin of hemorrhage with otherwise  stable volume. There remains surrounding rim of hypodensity and  adjacent sulcal effacement without greater central midline shift,  which remains approximately 4 mm to the left the septum.. The  grey-white differentiation elsewhere is intact.      CSF Spaces: Ventricles, other sulci and basal cisterns remain  patent.  There is a persistent 3-4 mm deep hyperdense extra-axial collection  over the area of parenchymal hemorrhage.      Calvarium: The calvarium remains intact.      Paranasal sinuses and mastoids: Visualized paranasal sinuses and  mastoids are clear.      Impression: Some remote distribution of hyperdensity toward the lower aspect of  the parenchymal hematoma in right frontal lobe. There may be slight  extension in this inferior area but overall the volume of hemorrhage  appears to have stabilized. There remains a small area of surrounding  edema with associated stable mass effect. There remains a tiny stable  area of acute extra-axial hematoma over the area of parenchymal  hematoma      Interpreted within [place], [place]      MACRO:  None      Signed by: Bobby Lacey 7/12/2024 12:04 PM  Dictation workstation:   [id number]  ECG 12 Lead  Sinus rhythm with 1st degree AV block with Premature atrial complexes with Aberrant conduction  Cannot rule out Anterior infarct , age undetermined  Abnormal ECG  When compared with ECG of 14-JUN-2024 19:04,  Aberrant conduction is now Present  CT head wo IV contrast  Narrative: Interpreted By:  Julio Navarro,   STUDY:  CT HEAD WO IV CONTRAST;  7/12/2024 2:24 am      INDICATION:  Signs/Symptoms:SAH follow up.      COMPARISON:  7/11/2024      ACCESSION NUMBER(S):  [id number]      ORDERING CLINICIAN:  DIANNE ALEXANDER      TECHNIQUE:  Contiguous axial images of the head were obtained without intravenous  contrast.      FINDINGS:  There is significant interval progression of intracranial hemorrhage.  There is interval development of intraparenchymal hematoma in the  right frontal lobe which measures 3.7 cm x 2.4 cm  x 4.4 cm with  surrounding edema. There is also interval development of right  frontal extra-axial hematoma measuring 4 mm in thickness.  Subarachnoid hemorrhage is also noted. There is mass effect on the  brain parenchyma with 4 mm right to  left midline shift. There is  cerebral atrophy and bilateral chronic white matter change. The  paranasal sinuses are clear and well pneumatized. No sinus air-fluid  level.      Impression: Significant interval progression of intracranial hemorrhage with  interval development of acute intraparenchymal hematoma in the right  frontal lobe which measures 3.7 cm x 2.4 x 4.4 cm with surrounding  edema and interval development of right frontal extra-axial hematoma  which measures 4 mm in thickness. Right subarachnoid hemorrhage is  also noted. There is mass effect on the underlying brain parenchyma  with approximately 4 mm right to left midline shift.      MACRO:  Julio Navarro discussed the significance and urgency of this critical  finding by telephone with  DIANNE ALEXANDER on 7/12/2024 at 2:59 am.  (**-RCF-**) Findings:  See findings.      Signed by: Julio Navarro 7/12/2024 2:59 AM  Dictation workstation:   [id number]      Physical Exam  General: Lethargic.  Minimally responsive.  HEENT:  Normocephalic, atraumatic, mucus membranes moist.   Neck:  Trachea midline.  No JVD.    Chest:  Clear to auscultation bilaterally. No wheezes, rales, or rhonchi.  CV:  Regular rate and rhythm.  Positive S1/S2.   Abdomen: Bowel sounds present in all four quadrants, abdomen is soft, non-tender, non-distended.  Extremities:  No lower extremity edema or cyanosis.   Neurological: Left-sided upper extremity flaccid paralysis.  Left arm weakness.  Left facial droop.  Aphasia present..  Skin:  Warm and dry.     Relevant Results  Results for orders placed or performed during the hospital encounter of 07/11/24 (from the past 24 hour(s))   B-Type Natriuretic Peptide   Result Value Ref Range    BNP 85 0 - 99 pg/mL   Magnesium   Result Value Ref Range    Magnesium 2.21 1.60 - 2.40 mg/dL   Comprehensive Metabolic Panel   Result Value Ref Range    Glucose 100 (H) 74 - 99 mg/dL    Sodium 135 (L) 136 - 145 mmol/L    Potassium 4.4 3.5 - 5.3 mmol/L     Chloride 100 98 - 107 mmol/L    Bicarbonate 26 21 - 32 mmol/L    Anion Gap 13 10 - 20 mmol/L    Urea Nitrogen 19 6 - 23 mg/dL    Creatinine 1.24 (H) 0.50 - 1.05 mg/dL    eGFR 42 (L) >60 mL/min/1.73m*2    Calcium 10.0 8.6 - 10.3 mg/dL    Albumin 4.9 3.4 - 5.0 g/dL    Alkaline Phosphatase 72 33 - 136 U/L    Total Protein 8.0 6.4 - 8.2 g/dL    AST 20 9 - 39 U/L    Bilirubin, Total 0.6 0.0 - 1.2 mg/dL    ALT 16 7 - 45 U/L   CBC and Auto Differential   Result Value Ref Range    WBC 5.9 4.4 - 11.3 x10*3/uL    nRBC 0.0 0.0 - 0.0 /100 WBCs    RBC 4.61 4.00 - 5.20 x10*6/uL    Hemoglobin 13.4 12.0 - 16.0 g/dL    Hematocrit 39.5 36.0 - 46.0 %    MCV 86 80 - 100 fL    MCH 29.1 26.0 - 34.0 pg    MCHC 33.9 32.0 - 36.0 g/dL    RDW 15.0 (H) 11.5 - 14.5 %    Platelets 273 150 - 450 x10*3/uL    Neutrophils % 51.5 40.0 - 80.0 %    Immature Granulocytes %, Automated 0.2 0.0 - 0.9 %    Lymphocytes % 29.8 13.0 - 44.0 %    Monocytes % 14.4 2.0 - 10.0 %    Eosinophils % 2.9 0.0 - 6.0 %    Basophils % 1.2 0.0 - 2.0 %    Neutrophils Absolute 3.05 1.60 - 5.50 x10*3/uL    Immature Granulocytes Absolute, Automated 0.01 0.00 - 0.50 x10*3/uL    Lymphocytes Absolute 1.76 0.80 - 3.00 x10*3/uL    Monocytes Absolute 0.85 (H) 0.05 - 0.80 x10*3/uL    Eosinophils Absolute 0.17 0.00 - 0.40 x10*3/uL    Basophils Absolute 0.07 0.00 - 0.10 x10*3/uL   Troponin I, High Sensitivity, Initial   Result Value Ref Range    Troponin I, High Sensitivity 5 0 - 13 ng/L   ECG 12 Lead   Result Value Ref Range    Ventricular Rate 72 BPM    Atrial Rate 72 BPM    RI Interval 238 ms    QRS Duration 74 ms    QT Interval 398 ms    QTC Calculation(Bazett) 435 ms    P Axis 70 degrees    R Axis -8 degrees    T Axis 19 degrees    QRS Count 12 beats    Q Onset 225 ms    P Onset 106 ms    P Offset 155 ms    T Offset 424 ms    QTC Fredericia 423 ms   Lipid Panel   Result Value Ref Range    Cholesterol 227 (H) 0 - 199 mg/dL    HDL-Cholesterol 51.1 mg/dL    Cholesterol/HDL Ratio 4.4      LDL Calculated 160 (H) <=99 mg/dL    VLDL 16 0 - 40 mg/dL    Triglycerides 78 0 - 149 mg/dL    Non HDL Cholesterol 176 (H) 0 - 149 mg/dL   Troponin I, High Sensitivity   Result Value Ref Range    Troponin I, High Sensitivity 4 0 - 13 ng/L   Protime-INR   Result Value Ref Range    Protime 12.3 9.8 - 12.8 seconds    INR 1.1 0.9 - 1.1   aPTT   Result Value Ref Range    aPTT 34 27 - 38 seconds   POCT GLUCOSE   Result Value Ref Range    POCT Glucose 160 (H) 74 - 99 mg/dL   Comprehensive metabolic panel   Result Value Ref Range    Glucose 136 (H) 74 - 99 mg/dL    Sodium 134 (L) 136 - 145 mmol/L    Potassium 4.0 3.5 - 5.3 mmol/L    Chloride 105 98 - 107 mmol/L    Bicarbonate 21 21 - 32 mmol/L    Anion Gap 12 10 - 20 mmol/L    Urea Nitrogen 21 6 - 23 mg/dL    Creatinine 1.07 (H) 0.50 - 1.05 mg/dL    eGFR 50 (L) >60 mL/min/1.73m*2    Calcium 8.8 8.6 - 10.3 mg/dL    Albumin 4.2 3.4 - 5.0 g/dL    Alkaline Phosphatase 56 33 - 136 U/L    Total Protein 6.8 6.4 - 8.2 g/dL    AST 16 9 - 39 U/L    Bilirubin, Total 0.5 0.0 - 1.2 mg/dL    ALT 13 7 - 45 U/L   Magnesium   Result Value Ref Range    Magnesium 1.93 1.60 - 2.40 mg/dL   Phosphorus   Result Value Ref Range    Phosphorus 3.4 2.5 - 4.9 mg/dL   CBC and Auto Differential   Result Value Ref Range    WBC 7.0 4.4 - 11.3 x10*3/uL    nRBC 0.0 0.0 - 0.0 /100 WBCs    RBC 3.81 (L) 4.00 - 5.20 x10*6/uL    Hemoglobin 11.1 (L) 12.0 - 16.0 g/dL    Hematocrit 32.6 (L) 36.0 - 46.0 %    MCV 86 80 - 100 fL    MCH 29.1 26.0 - 34.0 pg    MCHC 34.0 32.0 - 36.0 g/dL    RDW 14.7 (H) 11.5 - 14.5 %    Platelets 202 150 - 450 x10*3/uL    Neutrophils % 78.8 40.0 - 80.0 %    Immature Granulocytes %, Automated 0.4 0.0 - 0.9 %    Lymphocytes % 12.1 13.0 - 44.0 %    Monocytes % 8.0 2.0 - 10.0 %    Eosinophils % 0.3 0.0 - 6.0 %    Basophils % 0.4 0.0 - 2.0 %    Neutrophils Absolute 5.53 (H) 1.60 - 5.50 x10*3/uL    Immature Granulocytes Absolute, Automated 0.03 0.00 - 0.50 x10*3/uL    Lymphocytes Absolute  0.85 0.80 - 3.00 x10*3/uL    Monocytes Absolute 0.56 0.05 - 0.80 x10*3/uL    Eosinophils Absolute 0.02 0.00 - 0.40 x10*3/uL    Basophils Absolute 0.03 0.00 - 0.10 x10*3/uL   POCT GLUCOSE   Result Value Ref Range    POCT Glucose 127 (H) 74 - 99 mg/dL    CT head wo IV contrast    Result Date: 7/12/2024  Interpreted By:  Bobby Lacey, STUDY: CT HEAD WO IV CONTRAST;  7/12/2024 8:52 am   INDICATION: Signs/Symptoms:ICH.   COMPARISON: Head CT date 07/12/2024 at 2:20 a.m.   ACCESSION NUMBER(S): [id number]   ORDERING CLINICIAN: DIANNE ALEXANDER   TECHNIQUE: Noncontrast axial CT scan of head was performed. Angled reformats in brain and bone windows were generated 07/12/2024 at 8:50 a.m.. The images were reviewed in bone, brain, blood and soft tissue windows.   FINDINGS: Parenchyma: There remains parenchymal hemorrhage in the right mid superior frontal lobe. There has been reviewed distribution of hyperdensity to the inferior margin of hemorrhage with otherwise stable volume. There remains surrounding rim of hypodensity and adjacent sulcal effacement without greater central midline shift, which remains approximately 4 mm to the left the septum.. The grey-white differentiation elsewhere is intact.   CSF Spaces: Ventricles, other sulci and basal cisterns remain patent. There is a persistent 3-4 mm deep hyperdense extra-axial collection over the area of parenchymal hemorrhage.   Calvarium: The calvarium remains intact.   Paranasal sinuses and mastoids: Visualized paranasal sinuses and mastoids are clear.       Some remote distribution of hyperdensity toward the lower aspect of the parenchymal hematoma in right frontal lobe. There may be slight extension in this inferior area but overall the volume of hemorrhage appears to have stabilized. There remains a small area of surrounding edema with associated stable mass effect. There remains a tiny stable area of acute extra-axial hematoma over the area of parenchymal hematoma    Interpreted within [place], [place]   MACRO: None   Signed by: Bobby Lacey 7/12/2024 12:04 PM Dictation workstation:   [id number]    ECG 12 Lead    Result Date: 7/12/2024  Sinus rhythm with 1st degree AV block with Premature atrial complexes with Aberrant conduction Cannot rule out Anterior infarct , age undetermined Abnormal ECG When compared with ECG of 14-JUN-2024 19:04, Aberrant conduction is now Present    CT head wo IV contrast    Result Date: 7/12/2024  Interpreted By:  Julio Navarro, STUDY: CT HEAD WO IV CONTRAST;  7/12/2024 2:24 am   INDICATION: Signs/Symptoms:SAH follow up.   COMPARISON: 7/11/2024   ACCESSION NUMBER(S): [id number]   ORDERING CLINICIAN: DIANNE ALEXANDER   TECHNIQUE: Contiguous axial images of the head were obtained without intravenous contrast.   FINDINGS: There is significant interval progression of intracranial hemorrhage. There is interval development of intraparenchymal hematoma in the right frontal lobe which measures 3.7 cm x 2.4 cm  x 4.4 cm with surrounding edema. There is also interval development of right frontal extra-axial hematoma measuring 4 mm in thickness. Subarachnoid hemorrhage is also noted. There is mass effect on the brain parenchyma with 4 mm right to left midline shift. There is cerebral atrophy and bilateral chronic white matter change. The paranasal sinuses are clear and well pneumatized. No sinus air-fluid level.       Significant interval progression of intracranial hemorrhage with interval development of acute intraparenchymal hematoma in the right frontal lobe which measures 3.7 cm x 2.4 x 4.4 cm with surrounding edema and interval development of right frontal extra-axial hematoma which measures 4 mm in thickness. Right subarachnoid hemorrhage is also noted. There is mass effect on the underlying brain parenchyma with approximately 4 mm right to left midline shift.   MACRO: Julio Navarro discussed the significance and urgency of this  critical finding by telephone with  DIANNE ALEXANDER on 7/12/2024 at 2:59 am. (**-RCF-**) Findings:  See findings.   Signed by: Julio Navarro 7/12/2024 2:59 AM Dictation workstation:   [id number]    XR chest 2 views    Result Date: 7/11/2024  STUDY: Chest Radiographs;  7/11/2024 8:27 PM INDICATION: Chest pain. COMPARISON: None Available. ACCESSION NUMBER(S): [id number] ORDERING CLINICIAN: NATASHA HURST TECHNIQUE:  Frontal and lateral chest. FINDINGS: CARDIOMEDIASTINAL SILHOUETTE: Cardiomediastinal silhouette is normal in size and configuration.  LUNGS: Lungs are clear.  ABDOMEN: No remarkable upper abdominal findings.  BONES: No acute osseous changes.    No acute cardiopulmonary abnormality by radiograph. Signed by Umang Sainz MD    CT head wo IV contrast    Result Date: 7/11/2024  Interpreted By:  Julio Navarro, STUDY: CT HEAD WO IV CONTRAST;  7/11/2024 8:12 pm   INDICATION: Signs/Symptoms:Left hand numbness/weakness for over 24 hours..   COMPARISON: None   ACCESSION NUMBER(S): [id number]   ORDERING CLINICIAN: NATASHA HURST   TECHNIQUE: Contiguous axial images of the head were obtained without intravenous contrast.   FINDINGS: There is mild acute subarachnoid hemorrhage in the right frontal lobe. There is cerebral atrophy and bilateral chronic white matter change. No mass effect or midline shift. No hydrocephalus. The basal cisterns are preserved. The paranasal sinuses are clear and well pneumatized. No evidence of depressed calvarial fracture.       Mild acute subarachnoid hemorrhage in the right frontal lobe.   MACRO: Julio Navarro discussed the significance and urgency of this critical finding by telephone with the emergency room physician at [facility] on 7/11/2024 at 8:41 pm.  (**-RCF-**) Findings:  See findings.   Signed by: Julio Navarro 7/11/2024 8:41 PM Dictation workstation:   [id number]    CT cervical spine wo IV contrast    Result Date: 7/11/2024  Interpreted By:  Julio Navarro,  STUDY: CT CERVICAL SPINE WO IV CONTRAST;  7/11/2024 8:13 pm   INDICATION: Signs/Symptoms:Left hand numbness/weakness for over 24 hours..   COMPARISON: None.   ACCESSION NUMBER(S): [id number]   ORDERING CLINICIAN: NATASHA HURST   TECHNIQUE: Contiguous axial images of the cervical spine were obtained without intravenous contrast. Coronal and sagittal reformatted images were obtained from the axial images.   FINDINGS: No acute fracture of the cervical spine. There is multilevel degenerative change of the cervical spine. There is multilevel intervertebral disc space narrowing and anterior osseous spurring. There is limited evaluation of the soft tissues of the spinal canal. There is mild degenerative facet and uncovertebral arthropathy. No significant prevertebral soft tissue edema.       No evidence of acute fracture of the cervical spine.   Multilevel degenerative change of the cervical spine.   Straightening of the normal cervical lordosis which may be secondary to patient positioning or muscle spasm.   MACRO: None   Signed by: Julio Navarro 7/11/2024 8:37 PM Dictation workstation:   [id number]    ECG 12 lead    Result Date: 6/20/2024  Sinus rhythm with sinus arrhythmia with 1st degree AV block Otherwise normal ECG When compared with ECG of 22-JAN-1997 15:22, NE interval has increased ST no longer depressed in Inferior leads See ED provider note for full interpretation and clinical correlation Confirmed by Belle Alexander ([id number]) on 6/20/2024 6:18:04 PM    US gallbladder    Result Date: 6/14/2024  STUDY: Right Upper Quadrant Ultrasound; 6/14/2024 6:55 PM INDICATION: Abdominal pain.  Nausea and vomiting. COMPARISON: None Available. ACCESSION NUMBER(S): [id number] ORDERING CLINICIAN: JULIUS ANTONY TECHNIQUE: Ultrasound of the Right Upper Quadrant. FINDINGS: LIVER: The liver demonstrates normal echogenicity.   GALLBLADDER: The gallbladder The gallbladder contains a single stone.   There is no pericholecystic  fluid or wall thickening.  Sonographic Navarro's sign is reported to be negative.  BILE DUCTS: The common bile duct measures 0.9 cm.  There is no intrahepatic biliary dilatation.   PANCREAS: The pancreas demonstrates a normal homogeneous echotexture with the tail not well seen due to overlying bowel gas.  RIGHT KIDNEY: The right kidney measures 8.2 cm in length.  Renal cortical echotexture is normal.  There is no hydronephrosis.  There are no stones.  There are no cysts.    Cholelithiasis.  No gallbladder wall thickening or biliary dilatation is appreciated. Signed by Rene Chen MD    CT abdomen pelvis wo IV contrast    Result Date: 6/14/2024  Interpreted By:  Jean Claude Mercado, STUDY: CT ABDOMEN PELVIS WO IV CONTRAST; 6/14/2024 12:55 pm   INDICATION: Signs/Symptoms:abdominal pain/nausea/vomiting/diarrhea.   COMPARISON: None.   ACCESSION NUMBER(S): [id number]   ORDERING CLINICIAN: MOLINA WANG   TECHNIQUE: COMMENTS: This exam is performed without oral or intravenous contrast as was requested. Please note that the absence of oral and intravenous contrast material does limit the sensitivity and specificity of this examination. In particular solid organ assessment for neoplasm is significantly limited. Assessment of the GI tract is also limited without the aid of oral contrast administration. Vascular abnormalities such as dissection, occlusions, infarcts and thrombus may also go undetected.   One or more of the following dose reduction techniques were used: Automated exposure control Adjustment of the mA and/or kV according to patient size, and/or use of iterative reconstruction technique.   FINDINGS: Pulmonary bases are somewhat obscured by motion artifact. Lingular subsegmental atelectasis is present.   Within limits of this unenhanced study no focal masses are identified within the liver, spleen, or adrenal glands.  Right kidney is unremarkable. There is a 8 mm hyperdense focus upper pole left kidney posteriorly  likely representing hyperdense cyst such as a hemorrhagic cyst. The 1.2 cm hypodensity lower pole left kidney medially likely representing a cyst.   There is a slightly hazy appearance of the peripancreatic fat adjacent to the pancreatic head which appears to be mild diffusely enlarged. Body and tail of the pancreas are normal. There is also slight dilatation of the common bile duct measuring up to 11 mm in diameter.   There is no evidence for abdominal aortic aneurysm. No retroperitoneal lymphadenopathy is identified. There is a small mesenteric fat containing umbilical hernia.   PELVIC CT: No pathologic masses or fluid collections are identified. Lumbar spondylosis is present along with bilateral hip osteoarthritis. Spinal canal stenosis is present at L4-5 secondary to facet arthropathy and disc herniation. Calcified uterine fibroids are present. No pelvic lymphadenopathy is identified.       1. Subtle abnormal appearance to the pancreatic head suggesting acute pancreatitis. Please correlate clinically. Common duct dilatation is present measuring 11 mm. Right upper quadrant ultrasound may be useful for further characterization of these findings. 2. Probable left-sided renal cysts as described above. 3. Calcified uterine fibroids. 4. Lumbar spondylosis with L4-5 spinal canal stenosis. 5. Small mesenteric fat containing umbilical hernia.   MACRO: none   Signed by: Jean Claude Mercado 6/14/2024 2:39 PM Dictation workstation:   [id number]       Scheduled medications  amiodarone, 150 mg, intravenous, Once  amLODIPine, 2.5 mg, oral, Daily  hydrALAZINE, 25 mg, oral, Nightly  insulin regular, 0-5 Units, subcutaneous, q4h  oxygen, , inhalation, Continuous - Inhalation  pantoprazole, 40 mg, intravenous, Daily before breakfast      Continuous medications  amiodarone, 0.5-1 mg/min  niCARdipine, 2.5-15 mg/hr, Last Rate: 5 mg/hr (07/12/24 1138)  niCARdipine, 1-15 mg/hr  sodium chloride 0.9%, 125 mL/hr, Last Rate: 125 mL/hr  (07/12/24 4865)      PRN medications  PRN medications: dextrose, dextrose, glucagon, glucagon, haloperidol lactate, hydrALAZINE, hydrALAZINE, labetaloL, niCARdipine, ondansetron   Assessment/Plan   Nehal Walden is an 89-year-old female presenting to hospital with sudden onset left arm numbness, pain, decreased dexterity (plus paralysis of left upper extremity, left facial droop, aphasia.  CT brain showed right frontal lobe acute subarachnoid hemorrhage.  Repeat CT head 6 hours later showed increased interval progression and development of right to left midline shift.  There is increased extension of the bleed.  She is also hypertensive when she came in.    Assessment + Plan:     Neuro:  # Subarachnoid hemorrhage  # Amyloid angiopathy  - Patient is lethargic.  She initially presented with left-sided symptoms including left arm numbness, weakness, flaccid paralysis of left upper extremity, left facial droop, and aphasia.  She was hypertensive on admission and was started on a Cardene drip.  Repeat CT head showed interval progression of subarachnoid hemorrhage.  Neurosurgery advised for no surgical intervention.  - We gave a bolus of 3% hypertonic saline.  - Discussed clinical situation with family regarding poor prognosis.  Family decided that they want to make the patient DNR comfort care consult hospice.  Patient's family stated that she would not want any further escalation of care and does not want to be intubated.  We will make DNR comfort care and consult hospice.    CV:  -No acute issues    Respiratory:  -No acute issues    GI:  -No acute issues    Endo:  -No acute issues    Renal:  # KELECHI  - Patient DNR comfort care.  We will suspend all active treatment.    Hematological:  -No acute issues    ID:  -No acute issues    Vent/O2: Nasal cannula  Lines/Devices: Peripheral  Drips: None  ATBx: None  Fluids: None  Diet: N.p.o.  PPX: None  Code status: DNR-comfort care  Dispo: ICU     Alvaro Acosta MD  PGY 2 critical  care

## 2024-07-13 LAB — GLUCOSE BLD MANUAL STRIP-MCNC: 135 MG/DL (ref 74–99)

## 2024-07-13 PROCEDURE — 2500000005 HC RX 250 GENERAL PHARMACY W/O HCPCS: Performed by: INTERNAL MEDICINE

## 2024-07-13 PROCEDURE — 2020000001 HC ICU ROOM DAILY

## 2024-07-13 PROCEDURE — 99291 CRITICAL CARE FIRST HOUR: CPT

## 2024-07-13 PROCEDURE — C9113 INJ PANTOPRAZOLE SODIUM, VIA: HCPCS | Performed by: INTERNAL MEDICINE

## 2024-07-13 PROCEDURE — 2500000004 HC RX 250 GENERAL PHARMACY W/ HCPCS (ALT 636 FOR OP/ED): Performed by: INTERNAL MEDICINE

## 2024-07-13 PROCEDURE — 82947 ASSAY GLUCOSE BLOOD QUANT: CPT

## 2024-07-13 RX ORDER — ACETAMINOPHEN 650 MG/1
650 SUPPOSITORY RECTAL EVERY 4 HOURS PRN
Status: DISCONTINUED | OUTPATIENT
Start: 2024-07-13 | End: 2024-07-15 | Stop reason: HOSPADM

## 2024-07-13 ASSESSMENT — PAIN - FUNCTIONAL ASSESSMENT
PAIN_FUNCTIONAL_ASSESSMENT: CPOT (CRITICAL CARE PAIN OBSERVATION TOOL)
PAIN_FUNCTIONAL_ASSESSMENT: CPOT (CRITICAL CARE PAIN OBSERVATION TOOL)

## 2024-07-13 ASSESSMENT — PAIN SCALES - WONG BAKER: WONGBAKER_NUMERICALRESPONSE: NO HURT

## 2024-07-13 NOTE — PROGRESS NOTES
HWR confirmed meeting today with family around [phone]. LARA Martinez  Update 1641 SW notified by HWR patient appropriate for hospice but is not appropriate for GIP at this time and that family is wanting patient to remain in the hospital through the weekend. SW was notified family was interested in [facility] [facility] as granddaughter works there. Family aware [place] is private pay but appreciate referral to see if facility would consider due to granddaughter working their. Granddaughter was going to follow-up with the business office. SW was not able to send referral in [facility] as [facility]. Will need to follow-up on Monday if facility will accept with hospice care following. Care Transition team to follow and assist as needed. LARA Martinez

## 2024-07-13 NOTE — CARE PLAN
Problem: General Stroke  Goal: Demonstrate improvement in neurological exam throughout the shift  Outcome: Not Progressing  Goal: No symptoms of hemorrhage throughout shift  Outcome: Not Progressing     Problem: General Stroke  Goal: Establish a mutual long term goal with patient by discharge  Outcome: Progressing  Goal: Maintain BP within ordered limits throughout shift  Outcome: Progressing  Goal: Participate in treatment (ie., meds, therapy) throughout shift  Outcome: Progressing  Goal: No symptoms of aspiration throughout shift  Outcome: Progressing  Goal: Decreased nausea/vomiting throughout shift  Outcome: Progressing  Goal: Controlled blood glucose throughout shift  Outcome: Progressing     Problem: ICU Stroke  Goal: Maintain patent airway throughout shift  Outcome: Progressing   The patient's goals for the shift include  comfort    The clinical goals for the shift include  maintain systolic bp less than 140    Over the shift, the patient did not make progress toward the following goals. Barriers to progression include clinical progression, patient comfort care. Recommendations to address these barriers include continue to keep head elevated and promote comfort, titrate cardene while pt continued on drip.    Problem: General Stroke  Goal: Demonstrate improvement in neurological exam throughout the shift  Outcome: Not Progressing  Goal: No symptoms of hemorrhage throughout shift  Outcome: Not Progressing

## 2024-07-13 NOTE — NURSING NOTE
RN Hospice Note    Nehal Walden is a Hospice Patient.   Hospice terminal diagnosis: Cerebrovascular disese  Physician: Celia  Visit type: Initial Visit: Consents signed, Hospice Admission date TBD    Comments/recommendations: Met with grandarabellaughter/POTERI Lynch, friend Charissa, and dtr Annalisa outside of pt room to discuss hospice philosophy and services.  Explained the role of GIP in the hospital if pt qualified vs admission to IPU with subsequent transition to the Transitions program if pt appropriate or consideration of discharge to a nursing facility closer to home.  Rajni works at [facility] and requested referral be sent there; Mira Fang in case management stated that it is an [facility], therefore would not take Medicaid for [employer] unless approved under a waiver program, [employer] would be private pay, and often the [facility] requires family provide 24/7 for direct admission under hospice if they were not a previous resident.  Presented this to rajni who verbalized understanding and plans to speak further with the administration at the facility to confirm exactly what their obligation would be if the facility would be willing to accept the pt.  Provided a list of other facilities in the [facility] that they may consider in case [facility] is not an option.  Aware of IPU as another option; did not opt for IPU initially d/t possibility of having to move patient twice if she stabilizes/lingers.  Furthermore family requested for patient to remain at the hospital through the weekend, they feel safe at this hospital and trust the care.  However, pt does not meet criteria for GIP level of care at this time.  Aware that if pt condition deteriorates hospice can be re-consulted any time to evaluate for GIP vs IPU.  HWR will continue to follow with daily phone calls and once discharge disposition confirmed can assist with coordinating DME, meds, and/or transport.      Discharge Planning:  Patient to be  discharged to  TBD; [facility] and family given list of other local facilities to consider if [place] will not be an option    The following is to be completed:  Discharge order: tbd  State DNR signed by MD: [facility]  Nursing facility referral/transfer form: tbd  Medication reconciliation: tbd  PAS/RR or convalescent stay form: tbd  Prescriptions for al narcotics/new medications: tbd  Transportation: tbd  Other: tbd    Plan of care reviewed with patient/family members Cris Delaney, granddaughter, Annalisa Rhett, dtr, Charissa Lofton, friend   Plan of care reviewed with hospital staff members: Mira Headley, case management, Shantal Newby RN, Dr. Christopher, Dr. Acosta    Please notify [facility] of any changes in condition. Thank you.  Office: [phone] (8 am-6:30 pm M-F and 8 am-4:30 pm weekends and holidays)   [phone] (6:30 pm-8 am M-F and 4:30 pm-8 am weekends and holidays)    Kim Gomes RN

## 2024-07-13 NOTE — PROGRESS NOTES
Nehal Walden is a 89 y.o. female on day 2 of admission presenting with SAH (subarachnoid hemorrhage) (Multi).      Subjective   Patient seen and examined at bedside.  Very lethargic.  Pupils nonreactive.  Not responding to any commands.  She is unable to speak.       Objective     Last Recorded Vitals  /73   Pulse 89   Temp 37.4 °C (99.3 °F)   Resp 19   Wt 65 kg (143 lb 4.8 oz)   SpO2 92%   Intake/Output last 3 Shifts:    Intake/Output Summary (Last 24 hours) at 7/13/2024 1342  Last data filed at 7/13/2024 0500  Gross per 24 hour   Intake 3224.99 ml   Output 800 ml   Net 2424.99 ml       Admission Weight  Weight: 63.5 kg (140 lb) (07/11/24 1749)    Daily Weight  07/11/24 : 65 kg (143 lb 4.8 oz)    Image Results  CT head wo IV contrast  Narrative: Interpreted By:  Bobby Lacey,   STUDY:  CT HEAD WO IV CONTRAST;  7/12/2024 8:52 am      INDICATION:  Signs/Symptoms:ICH.      COMPARISON:  Head CT date 07/12/2024 at 2:20 a.m.      ACCESSION NUMBER(S):  [id number]      ORDERING CLINICIAN:  DIANNE ALEXANDER      TECHNIQUE:  Noncontrast axial CT scan of head was performed. Angled reformats in  brain and bone windows were generated 07/12/2024 at 8:50 a.m.. The  images were reviewed in bone, brain, blood and soft tissue windows.      FINDINGS:  Parenchyma: There remains parenchymal hemorrhage in the right mid  superior frontal lobe. There has been reviewed distribution of  hyperdensity to the inferior margin of hemorrhage with otherwise  stable volume. There remains surrounding rim of hypodensity and  adjacent sulcal effacement without greater central midline shift,  which remains approximately 4 mm to the left the septum.. The  grey-white differentiation elsewhere is intact.      CSF Spaces: Ventricles, other sulci and basal cisterns remain patent.  There is a persistent 3-4 mm deep hyperdense extra-axial collection  over the area of parenchymal hemorrhage.      Calvarium: The calvarium remains intact.       Paranasal sinuses and mastoids: Visualized paranasal sinuses and  mastoids are clear.      Impression: Some remote distribution of hyperdensity toward the lower aspect of  the parenchymal hematoma in right frontal lobe. There may be slight  extension in this inferior area but overall the volume of hemorrhage  appears to have stabilized. There remains a small area of surrounding  edema with associated stable mass effect. There remains a tiny stable  area of acute extra-axial hematoma over the area of parenchymal  hematoma      Interpreted within [place], [place]      MACRO:  None      Signed by: Bobby Lacey 7/12/2024 12:04 PM  Dictation workstation:   [id number]  ECG 12 Lead  Sinus rhythm with 1st degree AV block with Premature atrial complexes with Aberrant conduction  Cannot rule out Anterior infarct , age undetermined  Abnormal ECG  When compared with ECG of 14-JUN-2024 19:04,  Aberrant conduction is now Present  CT head wo IV contrast  Narrative: Interpreted By:  Julio Navarro,   STUDY:  CT HEAD WO IV CONTRAST;  7/12/2024 2:24 am      INDICATION:  Signs/Symptoms:SAH follow up.      COMPARISON:  7/11/2024      ACCESSION NUMBER(S):  [id number]      ORDERING CLINICIAN:  DIANNE ALEXANDER      TECHNIQUE:  Contiguous axial images of the head were obtained without intravenous  contrast.      FINDINGS:  There is significant interval progression of intracranial hemorrhage.  There is interval development of intraparenchymal hematoma in the  right frontal lobe which measures 3.7 cm x 2.4 cm  x 4.4 cm with  surrounding edema. There is also interval development of right  frontal extra-axial hematoma measuring 4 mm in thickness.  Subarachnoid hemorrhage is also noted. There is mass effect on the  brain parenchyma with 4 mm right to left midline shift. There is  cerebral atrophy and bilateral chronic white matter change. The  paranasal sinuses are clear and well pneumatized. No sinus  air-fluid  level.      Impression: Significant interval progression of intracranial hemorrhage with  interval development of acute intraparenchymal hematoma in the right  frontal lobe which measures 3.7 cm x 2.4 x 4.4 cm with surrounding  edema and interval development of right frontal extra-axial hematoma  which measures 4 mm in thickness. Right subarachnoid hemorrhage is  also noted. There is mass effect on the underlying brain parenchyma  with approximately 4 mm right to left midline shift.      MACRO:  Julio Navarro discussed the significance and urgency of this critical  finding by telephone with  DIANNE ALEXANDER on 7/12/2024 at 2:59 am.  (**-RCF-**) Findings:  See findings.      Signed by: Julio Navarro 7/12/2024 2:59 AM  Dictation workstation:   [id number]      Physical Exam  General: Lethargic.  Not responding to commands.  HEENT:  Normocephalic, atraumatic, mucus membranes moist.   Neck:  Trachea midline.  No JVD.    Chest:  Clear to auscultation bilaterally. No wheezes, rales, or rhonchi.  CV:  Regular rate and rhythm.  Positive S1/S2.   Abdomen: Bowel sounds present in all four quadrants, abdomen is soft, non-tender, non-distended.  Extremities:  No lower extremity edema or cyanosis.   Neurological: Left-sided upper extremity flaccid paralysis.  Left arm weakness.  Left facial droop.  Aphasia present..  Skin:  Warm and dry.     Relevant Results  Results for orders placed or performed during the hospital encounter of 07/11/24 (from the past 24 hour(s))   POCT GLUCOSE   Result Value Ref Range    POCT Glucose 134 (H) 74 - 99 mg/dL   POCT GLUCOSE   Result Value Ref Range    POCT Glucose 135 (H) 74 - 99 mg/dL    CT head wo IV contrast    Result Date: 7/12/2024  Interpreted By:  Bobby Lacey, STUDY: CT HEAD WO IV CONTRAST;  7/12/2024 8:52 am   INDICATION: Signs/Symptoms:ICH.   COMPARISON: Head CT date 07/12/2024 at 2:20 a.m.   ACCESSION NUMBER(S): [id number]   ORDERING CLINICIAN: DIANNE ALEXANDER   TECHNIQUE:  Noncontrast axial CT scan of head was performed. Angled reformats in brain and bone windows were generated 07/12/2024 at 8:50 a.m.. The images were reviewed in bone, brain, blood and soft tissue windows.   FINDINGS: Parenchyma: There remains parenchymal hemorrhage in the right mid superior frontal lobe. There has been reviewed distribution of hyperdensity to the inferior margin of hemorrhage with otherwise stable volume. There remains surrounding rim of hypodensity and adjacent sulcal effacement without greater central midline shift, which remains approximately 4 mm to the left the septum.. The grey-white differentiation elsewhere is intact.   CSF Spaces: Ventricles, other sulci and basal cisterns remain patent. There is a persistent 3-4 mm deep hyperdense extra-axial collection over the area of parenchymal hemorrhage.   Calvarium: The calvarium remains intact.   Paranasal sinuses and mastoids: Visualized paranasal sinuses and mastoids are clear.       Some remote distribution of hyperdensity toward the lower aspect of the parenchymal hematoma in right frontal lobe. There may be slight extension in this inferior area but overall the volume of hemorrhage appears to have stabilized. There remains a small area of surrounding edema with associated stable mass effect. There remains a tiny stable area of acute extra-axial hematoma over the area of parenchymal hematoma   Interpreted within [place], [place]   MACRO: None   Signed by: Bobby Laecy 7/12/2024 12:04 PM Dictation workstation:   [id number]    ECG 12 Lead    Result Date: 7/12/2024  Sinus rhythm with 1st degree AV block with Premature atrial complexes with Aberrant conduction Cannot rule out Anterior infarct , age undetermined Abnormal ECG When compared with ECG of 14-JUN-2024 19:04, Aberrant conduction is now Present    CT head wo IV contrast    Result Date: 7/12/2024  Interpreted By:  Julio Navarro, STUDY: CT HEAD WO IV CONTRAST;   7/12/2024 2:24 am   INDICATION: Signs/Symptoms:SAH follow up.   COMPARISON: 7/11/2024   ACCESSION NUMBER(S): [id number]   ORDERING CLINICIAN: DIANNE ALEXANDER   TECHNIQUE: Contiguous axial images of the head were obtained without intravenous contrast.   FINDINGS: There is significant interval progression of intracranial hemorrhage. There is interval development of intraparenchymal hematoma in the right frontal lobe which measures 3.7 cm x 2.4 cm  x 4.4 cm with surrounding edema. There is also interval development of right frontal extra-axial hematoma measuring 4 mm in thickness. Subarachnoid hemorrhage is also noted. There is mass effect on the brain parenchyma with 4 mm right to left midline shift. There is cerebral atrophy and bilateral chronic white matter change. The paranasal sinuses are clear and well pneumatized. No sinus air-fluid level.       Significant interval progression of intracranial hemorrhage with interval development of acute intraparenchymal hematoma in the right frontal lobe which measures 3.7 cm x 2.4 x 4.4 cm with surrounding edema and interval development of right frontal extra-axial hematoma which measures 4 mm in thickness. Right subarachnoid hemorrhage is also noted. There is mass effect on the underlying brain parenchyma with approximately 4 mm right to left midline shift.   MACRO: Julio Navarro discussed the significance and urgency of this critical finding by telephone with  DIANNE ALEXANDER on 7/12/2024 at 2:59 am. (**-RCF-**) Findings:  See findings.   Signed by: Julio Navarro 7/12/2024 2:59 AM Dictation workstation:   [id number]    XR chest 2 views    Result Date: 7/11/2024  STUDY: Chest Radiographs;  7/11/2024 8:27 PM INDICATION: Chest pain. COMPARISON: None Available. ACCESSION NUMBER(S): [id number] ORDERING CLINICIAN: NATASHA HURST TECHNIQUE:  Frontal and lateral chest. FINDINGS: CARDIOMEDIASTINAL SILHOUETTE: Cardiomediastinal silhouette is normal in size and configuration.   LUNGS: Lungs are clear.  ABDOMEN: No remarkable upper abdominal findings.  BONES: No acute osseous changes.    No acute cardiopulmonary abnormality by radiograph. Signed by Umang Sainz MD    CT head wo IV contrast    Result Date: 7/11/2024  Interpreted By:  Julio Navarro, STUDY: CT HEAD WO IV CONTRAST;  7/11/2024 8:12 pm   INDICATION: Signs/Symptoms:Left hand numbness/weakness for over 24 hours..   COMPARISON: None   ACCESSION NUMBER(S): [id number]   ORDERING CLINICIAN: NATASHA HURST   TECHNIQUE: Contiguous axial images of the head were obtained without intravenous contrast.   FINDINGS: There is mild acute subarachnoid hemorrhage in the right frontal lobe. There is cerebral atrophy and bilateral chronic white matter change. No mass effect or midline shift. No hydrocephalus. The basal cisterns are preserved. The paranasal sinuses are clear and well pneumatized. No evidence of depressed calvarial fracture.       Mild acute subarachnoid hemorrhage in the right frontal lobe.   MACRO: Julio Navarro discussed the significance and urgency of this critical finding by telephone with the emergency room physician at [facility] on 7/11/2024 at 8:41 pm.  (**-RCF-**) Findings:  See findings.   Signed by: Julio Navarro 7/11/2024 8:41 PM Dictation workstation:   [id number]    CT cervical spine wo IV contrast    Result Date: 7/11/2024  Interpreted By:  Julio Navarro, STUDY: CT CERVICAL SPINE WO IV CONTRAST;  7/11/2024 8:13 pm   INDICATION: Signs/Symptoms:Left hand numbness/weakness for over 24 hours..   COMPARISON: None.   ACCESSION NUMBER(S): [id number]   ORDERING CLINICIAN: NATASHA HURST   TECHNIQUE: Contiguous axial images of the cervical spine were obtained without intravenous contrast. Coronal and sagittal reformatted images were obtained from the axial images.   FINDINGS: No acute fracture of the cervical spine. There is multilevel degenerative change of the cervical spine. There is multilevel  intervertebral disc space narrowing and anterior osseous spurring. There is limited evaluation of the soft tissues of the spinal canal. There is mild degenerative facet and uncovertebral arthropathy. No significant prevertebral soft tissue edema.       No evidence of acute fracture of the cervical spine.   Multilevel degenerative change of the cervical spine.   Straightening of the normal cervical lordosis which may be secondary to patient positioning or muscle spasm.   MACRO: None   Signed by: Julio Navarro 7/11/2024 8:37 PM Dictation workstation:   [id number]    ECG 12 lead    Result Date: 6/20/2024  Sinus rhythm with sinus arrhythmia with 1st degree AV block Otherwise normal ECG When compared with ECG of 22-JAN-1997 15:22, CO interval has increased ST no longer depressed in Inferior leads See ED provider note for full interpretation and clinical correlation Confirmed by Belle Alexander ([id number]) on 6/20/2024 6:18:04 PM    US gallbladder    Result Date: 6/14/2024  STUDY: Right Upper Quadrant Ultrasound; 6/14/2024 6:55 PM INDICATION: Abdominal pain.  Nausea and vomiting. COMPARISON: None Available. ACCESSION NUMBER(S): [id number] ORDERING CLINICIAN: JULIUS ANTONY TECHNIQUE: Ultrasound of the Right Upper Quadrant. FINDINGS: LIVER: The liver demonstrates normal echogenicity.   GALLBLADDER: The gallbladder The gallbladder contains a single stone.   There is no pericholecystic fluid or wall thickening.  Sonographic Navarro's sign is reported to be negative.  BILE DUCTS: The common bile duct measures 0.9 cm.  There is no intrahepatic biliary dilatation.   PANCREAS: The pancreas demonstrates a normal homogeneous echotexture with the tail not well seen due to overlying bowel gas.  RIGHT KIDNEY: The right kidney measures 8.2 cm in length.  Renal cortical echotexture is normal.  There is no hydronephrosis.  There are no stones.  There are no cysts.    Cholelithiasis.  No gallbladder wall thickening or biliary  dilatation is appreciated. Signed by Rene Chen MD    CT abdomen pelvis wo IV contrast    Result Date: 6/14/2024  Interpreted By:  Jean Claude Mercado, STUDY: CT ABDOMEN PELVIS WO IV CONTRAST; 6/14/2024 12:55 pm   INDICATION: Signs/Symptoms:abdominal pain/nausea/vomiting/diarrhea.   COMPARISON: None.   ACCESSION NUMBER(S): [id number]   ORDERING CLINICIAN: MOLINA WANG   TECHNIQUE: COMMENTS: This exam is performed without oral or intravenous contrast as was requested. Please note that the absence of oral and intravenous contrast material does limit the sensitivity and specificity of this examination. In particular solid organ assessment for neoplasm is significantly limited. Assessment of the GI tract is also limited without the aid of oral contrast administration. Vascular abnormalities such as dissection, occlusions, infarcts and thrombus may also go undetected.   One or more of the following dose reduction techniques were used: Automated exposure control Adjustment of the mA and/or kV according to patient size, and/or use of iterative reconstruction technique.   FINDINGS: Pulmonary bases are somewhat obscured by motion artifact. Lingular subsegmental atelectasis is present.   Within limits of this unenhanced study no focal masses are identified within the liver, spleen, or adrenal glands.  Right kidney is unremarkable. There is a 8 mm hyperdense focus upper pole left kidney posteriorly likely representing hyperdense cyst such as a hemorrhagic cyst. The 1.2 cm hypodensity lower pole left kidney medially likely representing a cyst.   There is a slightly hazy appearance of the peripancreatic fat adjacent to the pancreatic head which appears to be mild diffusely enlarged. Body and tail of the pancreas are normal. There is also slight dilatation of the common bile duct measuring up to 11 mm in diameter.   There is no evidence for abdominal aortic aneurysm. No retroperitoneal lymphadenopathy is identified. There is a  small mesenteric fat containing umbilical hernia.   PELVIC CT: No pathologic masses or fluid collections are identified. Lumbar spondylosis is present along with bilateral hip osteoarthritis. Spinal canal stenosis is present at L4-5 secondary to facet arthropathy and disc herniation. Calcified uterine fibroids are present. No pelvic lymphadenopathy is identified.       1. Subtle abnormal appearance to the pancreatic head suggesting acute pancreatitis. Please correlate clinically. Common duct dilatation is present measuring 11 mm. Right upper quadrant ultrasound may be useful for further characterization of these findings. 2. Probable left-sided renal cysts as described above. 3. Calcified uterine fibroids. 4. Lumbar spondylosis with L4-5 spinal canal stenosis. 5. Small mesenteric fat containing umbilical hernia.   MACRO: none   Signed by: Jean Claude Mercado 6/14/2024 2:39 PM Dictation workstation:   [id number]       Scheduled medications  amiodarone, 150 mg, intravenous, Once  amLODIPine, 2.5 mg, oral, Daily  hydrALAZINE, 25 mg, oral, Nightly  insulin regular, 0-5 Units, subcutaneous, q4h  oxygen, , inhalation, Continuous - Inhalation  pantoprazole, 40 mg, intravenous, Daily before breakfast      Continuous medications  amiodarone, 0.5-1 mg/min  niCARdipine, 2.5-15 mg/hr, Last Rate: 5 mg/hr (07/13/24 0719)  sodium chloride 0.9%, 125 mL/hr, Last Rate: 125 mL/hr (07/13/24 0348)      PRN medications  PRN medications: acetaminophen, dextrose, dextrose, glucagon, glucagon, haloperidol lactate, hydrALAZINE, hydrALAZINE, labetaloL, ondansetron   Assessment/Plan   Nehal Walden is an 89-year-old female presenting to hospital with sudden onset left arm numbness, pain, decreased dexterity (plus paralysis of left upper extremity, left facial droop, aphasia.  CT brain showed right frontal lobe acute subarachnoid hemorrhage.  Repeat CT head 6 hours later showed increased interval progression and development of right to left  midline shift.  There is increased extension of the bleed.  She is also hypertensive when she came in.    Daily progress:  7/13: Patient was made DNR comfort care yesterday.  Awaiting hospice consult today.  Will discontinue all active treatment once hospice care is established.  She is currently on Cardene drip for BP control.  On 2 L nasal cannula oxygen.    Assessment + Plan:     Neuro:  # Subarachnoid hemorrhage  # Amyloid angiopathy  - Patient is lethargic and not responsive.  She initially presented with left-sided symptoms including left arm numbness, weakness, flaccid paralysis of left upper extremity, left facial droop, and aphasia.  She was hypertensive on admission and was started on a Cardene drip.  Repeat CT head showed interval progression of subarachnoid hemorrhage.  Neurosurgery advised for no surgical intervention.  - We gave a bolus of 3% hypertonic saline with no improvement in symptoms  - Patient's family decided to make her DNR comfort care and to initiate hospice consult.  Awaiting hospice discussion today.    CV:  -No acute issues    Respiratory:  -No acute issues    GI:  -No acute issues    Endo:  -No acute issues    Renal:  # KELECHI  - Patient DNR comfort care.  We will suspend all active treatment once hospice consult is made official    Hematological:  -No acute issues    ID:  -No acute issues    Vent/O2: Nasal cannula  Lines/Devices: Peripheral  Drips: None  ATBx: None  Fluids: None  Diet: N.p.o.  PPX: None  Code status: DNR-comfort care  Dispo: ICU     Alvaro Acosta MD  PGY 2 critical care

## 2024-07-14 VITALS
TEMPERATURE: 97.9 F | OXYGEN SATURATION: 94 % | WEIGHT: 143.3 LBS | HEIGHT: 64 IN | RESPIRATION RATE: 18 BRPM | BODY MASS INDEX: 24.46 KG/M2 | SYSTOLIC BLOOD PRESSURE: 179 MMHG | DIASTOLIC BLOOD PRESSURE: 83 MMHG | HEART RATE: 104 BPM

## 2024-07-14 PROCEDURE — 2020000001 HC ICU ROOM DAILY

## 2024-07-14 PROCEDURE — 2500000005 HC RX 250 GENERAL PHARMACY W/O HCPCS: Performed by: INTERNAL MEDICINE

## 2024-07-14 PROCEDURE — 2500000004 HC RX 250 GENERAL PHARMACY W/ HCPCS (ALT 636 FOR OP/ED): Performed by: INTERNAL MEDICINE

## 2024-07-14 PROCEDURE — 99291 CRITICAL CARE FIRST HOUR: CPT

## 2024-07-14 PROCEDURE — 2500000004 HC RX 250 GENERAL PHARMACY W/ HCPCS (ALT 636 FOR OP/ED)

## 2024-07-14 PROCEDURE — 2500000004 HC RX 250 GENERAL PHARMACY W/ HCPCS (ALT 636 FOR OP/ED): Performed by: ANESTHESIOLOGY

## 2024-07-14 PROCEDURE — C9113 INJ PANTOPRAZOLE SODIUM, VIA: HCPCS | Performed by: INTERNAL MEDICINE

## 2024-07-14 PROCEDURE — 2500000001 HC RX 250 WO HCPCS SELF ADMINISTERED DRUGS (ALT 637 FOR MEDICARE OP): Performed by: INTERNAL MEDICINE

## 2024-07-14 RX ORDER — MORPHINE SULFATE 4 MG/ML
4 INJECTION, SOLUTION INTRAMUSCULAR; INTRAVENOUS ONCE
Status: COMPLETED | OUTPATIENT
Start: 2024-07-14 | End: 2024-07-14

## 2024-07-14 RX ORDER — HYDROMORPHONE HYDROCHLORIDE 0.2 MG/ML
0.2 INJECTION INTRAMUSCULAR; INTRAVENOUS; SUBCUTANEOUS
Status: DISCONTINUED | OUTPATIENT
Start: 2024-07-14 | End: 2024-07-15

## 2024-07-14 ASSESSMENT — PAIN SCALES - PAIN ASSESSMENT IN ADVANCED DEMENTIA (PAINAD)
FACIALEXPRESSION: SAD, FRIGHTENED, FROWN
FACIALEXPRESSION: SAD, FRIGHTENED, FROWN
BODYLANGUAGE: TENSE, DISTRESSED PACING, FIDGETING
NEGVOCALIZATION: OCCASIONAL MOAN/GROAN, LOW SPEECH, NEGATIVE/DISAPPROVING QUALITY
BREATHING: OCCASIONAL LABORED BREATHING, SHORT PERIOD OF HYPERVENTILATION
BODYLANGUAGE: TENSE, DISTRESSED PACING, FIDGETING
TOTALSCORE: 5
TOTALSCORE: 5
CONSOLABILITY: DISTRACTED OR REASSURED BY VOICE/TOUCH
BREATHING: OCCASIONAL LABORED BREATHING, SHORT PERIOD OF HYPERVENTILATION
TOTALSCORE: MEDICATION (SEE MAR);REPOSITIONED;THERAPEUTIC PRESENCE;THERAPEUTIC TOUCH
NEGVOCALIZATION: OCCASIONAL MOAN/GROAN, LOW SPEECH, NEGATIVE/DISAPPROVING QUALITY
CONSOLABILITY: DISTRACTED OR REASSURED BY VOICE/TOUCH

## 2024-07-14 ASSESSMENT — PAIN SCALES - WONG BAKER
WONGBAKER_NUMERICALRESPONSE: HURTS LITTLE BIT
WONGBAKER_NUMERICALRESPONSE: NO HURT
WONGBAKER_NUMERICALRESPONSE: HURTS EVEN MORE
WONGBAKER_NUMERICALRESPONSE: NO HURT

## 2024-07-14 NOTE — PROGRESS NOTES
IVETT notified by bedside nurse that granddaughter called and [facility] requesting information to be faxed to  [phone]. Information faxed. Facility asking attending to complete there H/P. Attending is aware. Care Transition team to follow and assist as needed. LARA Martinez  Update 1257 IVETT updated by [facility] that a nurse will be out tomorrow to facilitate transfer to [facility] and that the DME equipment was ordered. LARA Martinez

## 2024-07-14 NOTE — CARE PLAN
The patient's goals for the shift include  comfort    The clinical goals for the shift include  maintain systolic bp less than 140    Over the shift, the patient did not make progress toward the following goals. Barriers to progression include progessing clinical course. Recommendations to address these barriers include closely monitor patient for pain as she is not able to express verbally. Patient is comforted by having someone hold her hand and morphine.   Problem: Pain  Goal: Turns in bed with improved pain control throughout the shift  Outcome: Progressing     Problem: General Stroke  Goal: Demonstrate improvement in neurological exam throughout the shift  Outcome: Not Progressing  Goal: No symptoms of hemorrhage throughout shift  Outcome: Not Progressing     Problem: General Stroke  Goal: Establish a mutual long term goal with patient by discharge  Outcome: Progressing  Flowsheets (Taken 7/14/2024 0804)  Establish a mutual long term goal with patient by discharge: Monitor blood pressure  Goal: Maintain BP within ordered limits throughout shift  Outcome: Progressing  Goal: Participate in treatment (ie., meds, therapy) throughout shift  Outcome: Progressing  Goal: No symptoms of aspiration throughout shift  Outcome: Progressing  Goal: Decreased nausea/vomiting throughout shift  Outcome: Progressing  Goal: Controlled blood glucose throughout shift  Outcome: Progressing     Problem: ICU Stroke  Goal: Maintain patent airway throughout shift  Outcome: Progressing     Problem: Pain  Goal: Turns in bed with improved pain control throughout the shift  Outcome: Progressing     Problem: ICU Stroke  Goal: Achieve/maintain targeted sodium level throughout shift  Outcome: Adequate for Discharge   .    Problem: General Stroke  Goal: Demonstrate improvement in neurological exam throughout the shift  Outcome: Not Progressing  Goal: No symptoms of hemorrhage throughout shift  Outcome: Not Progressing

## 2024-07-14 NOTE — PROGRESS NOTES
Nehal Walden is a 89 y.o. female on day 3 of admission presenting with SAH (subarachnoid hemorrhage) (Multi).      Subjective   Patient seen and examined at bedside.  Very lethargic.  Pupils nonreactive.  Not responding to any commands.       Objective     Last Recorded Vitals  /73   Pulse 100   Temp 37.4 °C (99.3 °F)   Resp 20   Wt 65 kg (143 lb 4.8 oz)   SpO2 94%   Intake/Output last 3 Shifts:    Intake/Output Summary (Last 24 hours) at 7/14/2024 1515  Last data filed at 7/14/2024 0658  Gross per 24 hour   Intake 2806.7 ml   Output 800 ml   Net 2006.7 ml       Admission Weight  Weight: 63.5 kg (140 lb) (07/11/24 1749)    Daily Weight  07/11/24 : 65 kg (143 lb 4.8 oz)    Image Results  CT head wo IV contrast  Narrative: Interpreted By:  Bobby Lacey,   STUDY:  CT HEAD WO IV CONTRAST;  7/12/2024 8:52 am      INDICATION:  Signs/Symptoms:ICH.      COMPARISON:  Head CT date 07/12/2024 at 2:20 a.m.      ACCESSION NUMBER(S):  [id number]      ORDERING CLINICIAN:  DIANNE ALEXANDER      TECHNIQUE:  Noncontrast axial CT scan of head was performed. Angled reformats in  brain and bone windows were generated 07/12/2024 at 8:50 a.m.. The  images were reviewed in bone, brain, blood and soft tissue windows.      FINDINGS:  Parenchyma: There remains parenchymal hemorrhage in the right mid  superior frontal lobe. There has been reviewed distribution of  hyperdensity to the inferior margin of hemorrhage with otherwise  stable volume. There remains surrounding rim of hypodensity and  adjacent sulcal effacement without greater central midline shift,  which remains approximately 4 mm to the left the septum.. The  grey-white differentiation elsewhere is intact.      CSF Spaces: Ventricles, other sulci and basal cisterns remain patent.  There is a persistent 3-4 mm deep hyperdense extra-axial collection  over the area of parenchymal hemorrhage.      Calvarium: The calvarium remains intact.      Paranasal sinuses and  mastoids: Visualized paranasal sinuses and  mastoids are clear.      Impression: Some remote distribution of hyperdensity toward the lower aspect of  the parenchymal hematoma in right frontal lobe. There may be slight  extension in this inferior area but overall the volume of hemorrhage  appears to have stabilized. There remains a small area of surrounding  edema with associated stable mass effect. There remains a tiny stable  area of acute extra-axial hematoma over the area of parenchymal  hematoma      Interpreted within [place], [place]      MACRO:  None      Signed by: Bobby Lacey 7/12/2024 12:04 PM  Dictation workstation:   [id number]  ECG 12 Lead  Sinus rhythm with 1st degree AV block with Premature atrial complexes with Aberrant conduction  Cannot rule out Anterior infarct , age undetermined  Abnormal ECG  When compared with ECG of 14-JUN-2024 19:04,  Aberrant conduction is now Present  CT head wo IV contrast  Narrative: Interpreted By:  Julio Navarro,   STUDY:  CT HEAD WO IV CONTRAST;  7/12/2024 2:24 am      INDICATION:  Signs/Symptoms:SAH follow up.      COMPARISON:  7/11/2024      ACCESSION NUMBER(S):  [id number]      ORDERING CLINICIAN:  DIANNE ALEXANDER      TECHNIQUE:  Contiguous axial images of the head were obtained without intravenous  contrast.      FINDINGS:  There is significant interval progression of intracranial hemorrhage.  There is interval development of intraparenchymal hematoma in the  right frontal lobe which measures 3.7 cm x 2.4 cm  x 4.4 cm with  surrounding edema. There is also interval development of right  frontal extra-axial hematoma measuring 4 mm in thickness.  Subarachnoid hemorrhage is also noted. There is mass effect on the  brain parenchyma with 4 mm right to left midline shift. There is  cerebral atrophy and bilateral chronic white matter change. The  paranasal sinuses are clear and well pneumatized. No sinus air-fluid  level.      Impression:  Significant interval progression of intracranial hemorrhage with  interval development of acute intraparenchymal hematoma in the right  frontal lobe which measures 3.7 cm x 2.4 x 4.4 cm with surrounding  edema and interval development of right frontal extra-axial hematoma  which measures 4 mm in thickness. Right subarachnoid hemorrhage is  also noted. There is mass effect on the underlying brain parenchyma  with approximately 4 mm right to left midline shift.      MACRO:  Julio Navarro discussed the significance and urgency of this critical  finding by telephone with  DIANNE ALEXANDER on 7/12/2024 at 2:59 am.  (**-RCF-**) Findings:  See findings.      Signed by: Julio Navarro 7/12/2024 2:59 AM  Dictation workstation:   [id number]      Physical Exam  General: Lethargic.  Not responding to commands.  HEENT:  Normocephalic, atraumatic, mucus membranes moist.   Neck:  Trachea midline.  No JVD.    Chest:  Clear to auscultation bilaterally. No wheezes, rales, or rhonchi.  CV:  Regular rate and rhythm.  Positive S1/S2.   Abdomen: Bowel sounds present in all four quadrants, abdomen is soft, non-tender, non-distended.  Extremities:  No lower extremity edema or cyanosis.   Neurological: Left-sided upper extremity flaccid paralysis.  Left arm weakness.  Left facial droop.  Aphasia present..  Skin:  Warm and dry.     Relevant Results  No results found for this or any previous visit (from the past 24 hour(s)).   CT head wo IV contrast    Result Date: 7/12/2024  Interpreted By:  Bobby Lacey, STUDY: CT HEAD WO IV CONTRAST;  7/12/2024 8:52 am   INDICATION: Signs/Symptoms:ICH.   COMPARISON: Head CT date 07/12/2024 at 2:20 a.m.   ACCESSION NUMBER(S): [id number]   ORDERING CLINICIAN: DIANNE ALEXANDER   TECHNIQUE: Noncontrast axial CT scan of head was performed. Angled reformats in brain and bone windows were generated 07/12/2024 at 8:50 a.m.. The images were reviewed in bone, brain, blood and soft tissue windows.   FINDINGS:  Parenchyma: There remains parenchymal hemorrhage in the right mid superior frontal lobe. There has been reviewed distribution of hyperdensity to the inferior margin of hemorrhage with otherwise stable volume. There remains surrounding rim of hypodensity and adjacent sulcal effacement without greater central midline shift, which remains approximately 4 mm to the left the septum.. The grey-white differentiation elsewhere is intact.   CSF Spaces: Ventricles, other sulci and basal cisterns remain patent. There is a persistent 3-4 mm deep hyperdense extra-axial collection over the area of parenchymal hemorrhage.   Calvarium: The calvarium remains intact.   Paranasal sinuses and mastoids: Visualized paranasal sinuses and mastoids are clear.       Some remote distribution of hyperdensity toward the lower aspect of the parenchymal hematoma in right frontal lobe. There may be slight extension in this inferior area but overall the volume of hemorrhage appears to have stabilized. There remains a small area of surrounding edema with associated stable mass effect. There remains a tiny stable area of acute extra-axial hematoma over the area of parenchymal hematoma   Interpreted within [place], [place]   MACRO: None   Signed by: Bobby Lacey 7/12/2024 12:04 PM Dictation workstation:   [id number]    ECG 12 Lead    Result Date: 7/12/2024  Sinus rhythm with 1st degree AV block with Premature atrial complexes with Aberrant conduction Cannot rule out Anterior infarct , age undetermined Abnormal ECG When compared with ECG of 14-JUN-2024 19:04, Aberrant conduction is now Present    CT head wo IV contrast    Result Date: 7/12/2024  Interpreted By:  Julio Navarro, STUDY: CT HEAD WO IV CONTRAST;  7/12/2024 2:24 am   INDICATION: Signs/Symptoms:SAH follow up.   COMPARISON: 7/11/2024   ACCESSION NUMBER(S): [id number]   ORDERING CLINICIAN: DIANNE ALEXANDER   TECHNIQUE: Contiguous axial images of the head were obtained  without intravenous contrast.   FINDINGS: There is significant interval progression of intracranial hemorrhage. There is interval development of intraparenchymal hematoma in the right frontal lobe which measures 3.7 cm x 2.4 cm  x 4.4 cm with surrounding edema. There is also interval development of right frontal extra-axial hematoma measuring 4 mm in thickness. Subarachnoid hemorrhage is also noted. There is mass effect on the brain parenchyma with 4 mm right to left midline shift. There is cerebral atrophy and bilateral chronic white matter change. The paranasal sinuses are clear and well pneumatized. No sinus air-fluid level.       Significant interval progression of intracranial hemorrhage with interval development of acute intraparenchymal hematoma in the right frontal lobe which measures 3.7 cm x 2.4 x 4.4 cm with surrounding edema and interval development of right frontal extra-axial hematoma which measures 4 mm in thickness. Right subarachnoid hemorrhage is also noted. There is mass effect on the underlying brain parenchyma with approximately 4 mm right to left midline shift.   MACRO: Julio Navarro discussed the significance and urgency of this critical finding by telephone with  DIANNE ALEXANDER on 7/12/2024 at 2:59 am. (**-RCF-**) Findings:  See findings.   Signed by: Julio Navarro 7/12/2024 2:59 AM Dictation workstation:   [id number]    XR chest 2 views    Result Date: 7/11/2024  STUDY: Chest Radiographs;  7/11/2024 8:27 PM INDICATION: Chest pain. COMPARISON: None Available. ACCESSION NUMBER(S): [id number] ORDERING CLINICIAN: NATASHA HURST TECHNIQUE:  Frontal and lateral chest. FINDINGS: CARDIOMEDIASTINAL SILHOUETTE: Cardiomediastinal silhouette is normal in size and configuration.  LUNGS: Lungs are clear.  ABDOMEN: No remarkable upper abdominal findings.  BONES: No acute osseous changes.    No acute cardiopulmonary abnormality by radiograph. Signed by Umang Sainz MD    CT head wo IV  contrast    Result Date: 7/11/2024  Interpreted By:  Julio Navarro, STUDY: CT HEAD WO IV CONTRAST;  7/11/2024 8:12 pm   INDICATION: Signs/Symptoms:Left hand numbness/weakness for over 24 hours..   COMPARISON: None   ACCESSION NUMBER(S): [id number]   ORDERING CLINICIAN: NATASHA HURST   TECHNIQUE: Contiguous axial images of the head were obtained without intravenous contrast.   FINDINGS: There is mild acute subarachnoid hemorrhage in the right frontal lobe. There is cerebral atrophy and bilateral chronic white matter change. No mass effect or midline shift. No hydrocephalus. The basal cisterns are preserved. The paranasal sinuses are clear and well pneumatized. No evidence of depressed calvarial fracture.       Mild acute subarachnoid hemorrhage in the right frontal lobe.   MACRO: Julio Navarro discussed the significance and urgency of this critical finding by telephone with the emergency room physician at [facility] on 7/11/2024 at 8:41 pm.  (**-RCF-**) Findings:  See findings.   Signed by: Julio Navarro 7/11/2024 8:41 PM Dictation workstation:   [id number]    CT cervical spine wo IV contrast    Result Date: 7/11/2024  Interpreted By:  Julio Navarro, STUDY: CT CERVICAL SPINE WO IV CONTRAST;  7/11/2024 8:13 pm   INDICATION: Signs/Symptoms:Left hand numbness/weakness for over 24 hours..   COMPARISON: None.   ACCESSION NUMBER(S): [id number]   ORDERING CLINICIAN: NATASHA HURST   TECHNIQUE: Contiguous axial images of the cervical spine were obtained without intravenous contrast. Coronal and sagittal reformatted images were obtained from the axial images.   FINDINGS: No acute fracture of the cervical spine. There is multilevel degenerative change of the cervical spine. There is multilevel intervertebral disc space narrowing and anterior osseous spurring. There is limited evaluation of the soft tissues of the spinal canal. There is mild degenerative facet and uncovertebral arthropathy. No significant  prevertebral soft tissue edema.       No evidence of acute fracture of the cervical spine.   Multilevel degenerative change of the cervical spine.   Straightening of the normal cervical lordosis which may be secondary to patient positioning or muscle spasm.   MACRO: None   Signed by: Julio Navarro 7/11/2024 8:37 PM Dictation workstation:   [id number]    ECG 12 lead    Result Date: 6/20/2024  Sinus rhythm with sinus arrhythmia with 1st degree AV block Otherwise normal ECG When compared with ECG of 22-JAN-1997 15:22, DC interval has increased ST no longer depressed in Inferior leads See ED provider note for full interpretation and clinical correlation Confirmed by Belle Alexander ([id number]) on 6/20/2024 6:18:04 PM    US gallbladder    Result Date: 6/14/2024  STUDY: Right Upper Quadrant Ultrasound; 6/14/2024 6:55 PM INDICATION: Abdominal pain.  Nausea and vomiting. COMPARISON: None Available. ACCESSION NUMBER(S): [id number] ORDERING CLINICIAN: JULIUS ANTONY TECHNIQUE: Ultrasound of the Right Upper Quadrant. FINDINGS: LIVER: The liver demonstrates normal echogenicity.   GALLBLADDER: The gallbladder The gallbladder contains a single stone.   There is no pericholecystic fluid or wall thickening.  Sonographic Navarro's sign is reported to be negative.  BILE DUCTS: The common bile duct measures 0.9 cm.  There is no intrahepatic biliary dilatation.   PANCREAS: The pancreas demonstrates a normal homogeneous echotexture with the tail not well seen due to overlying bowel gas.  RIGHT KIDNEY: The right kidney measures 8.2 cm in length.  Renal cortical echotexture is normal.  There is no hydronephrosis.  There are no stones.  There are no cysts.    Cholelithiasis.  No gallbladder wall thickening or biliary dilatation is appreciated. Signed by Rene Chen MD    CT abdomen pelvis wo IV contrast    Result Date: 6/14/2024  Interpreted By:  Jean Claude Mercado, STUDY: CT ABDOMEN PELVIS WO IV CONTRAST; 6/14/2024 12:55 pm   INDICATION:  Signs/Symptoms:abdominal pain/nausea/vomiting/diarrhea.   COMPARISON: None.   ACCESSION NUMBER(S): [id number]   ORDERING CLINICIAN: MOLINA WANG   TECHNIQUE: COMMENTS: This exam is performed without oral or intravenous contrast as was requested. Please note that the absence of oral and intravenous contrast material does limit the sensitivity and specificity of this examination. In particular solid organ assessment for neoplasm is significantly limited. Assessment of the GI tract is also limited without the aid of oral contrast administration. Vascular abnormalities such as dissection, occlusions, infarcts and thrombus may also go undetected.   One or more of the following dose reduction techniques were used: Automated exposure control Adjustment of the mA and/or kV according to patient size, and/or use of iterative reconstruction technique.   FINDINGS: Pulmonary bases are somewhat obscured by motion artifact. Lingular subsegmental atelectasis is present.   Within limits of this unenhanced study no focal masses are identified within the liver, spleen, or adrenal glands.  Right kidney is unremarkable. There is a 8 mm hyperdense focus upper pole left kidney posteriorly likely representing hyperdense cyst such as a hemorrhagic cyst. The 1.2 cm hypodensity lower pole left kidney medially likely representing a cyst.   There is a slightly hazy appearance of the peripancreatic fat adjacent to the pancreatic head which appears to be mild diffusely enlarged. Body and tail of the pancreas are normal. There is also slight dilatation of the common bile duct measuring up to 11 mm in diameter.   There is no evidence for abdominal aortic aneurysm. No retroperitoneal lymphadenopathy is identified. There is a small mesenteric fat containing umbilical hernia.   PELVIC CT: No pathologic masses or fluid collections are identified. Lumbar spondylosis is present along with bilateral hip osteoarthritis. Spinal canal stenosis is present at  L4-5 secondary to facet arthropathy and disc herniation. Calcified uterine fibroids are present. No pelvic lymphadenopathy is identified.       1. Subtle abnormal appearance to the pancreatic head suggesting acute pancreatitis. Please correlate clinically. Common duct dilatation is present measuring 11 mm. Right upper quadrant ultrasound may be useful for further characterization of these findings. 2. Probable left-sided renal cysts as described above. 3. Calcified uterine fibroids. 4. Lumbar spondylosis with L4-5 spinal canal stenosis. 5. Small mesenteric fat containing umbilical hernia.   MACRO: none   Signed by: Jean Claude Mercado 6/14/2024 2:39 PM Dictation workstation:   [id number]       Scheduled medications  amLODIPine, 2.5 mg, oral, Daily  hydrALAZINE, 25 mg, oral, Nightly  insulin regular, 0-5 Units, subcutaneous, q4h  oxygen, , inhalation, Continuous - Inhalation  pantoprazole, 40 mg, intravenous, Daily before breakfast      Continuous medications  niCARdipine, 2.5-15 mg/hr, Last Rate: Stopped (07/14/24 1130)  sodium chloride 0.9%, 125 mL/hr, Last Rate: Stopped (07/14/24 0020)      PRN medications  PRN medications: acetaminophen, dextrose, dextrose, glucagon, glucagon, haloperidol lactate, hydrALAZINE, HYDROmorphone, ondansetron   Assessment/Plan   Nehal Walden is an 89-year-old female presenting to hospital with sudden onset left arm numbness, pain, decreased dexterity (plus paralysis of left upper extremity, left facial droop, aphasia.  CT brain showed right frontal lobe acute subarachnoid hemorrhage.  Repeat CT head 6 hours later showed increased interval progression and development of right to left midline shift.  There is increased extension of the bleed.  She is also hypertensive when she came in.    Daily progress:  7/14 awaiting assisted living facility placement.  Dilaudid for pain relief.  On 2 L nasal cannula oxygen.    Assessment + Plan:     Neuro:  # Subarachnoid hemorrhage  # Amyloid  angiopathy  - Patient is lethargic and minimally responsive.  She initially presented with left-sided symptoms including left arm numbness, weakness, flaccid paralysis of left upper extremity, left facial droop, and aphasia.  She was hypertensive on admission and was started on a Cardene drip.  Repeat CT head showed interval progression of subarachnoid hemorrhage.  Neurosurgery advised for no surgical intervention.  - We gave a bolus of 3% hypertonic saline with no improvement in symptoms  - Patient's family decided to make her DNR comfort care and to initiate hospice consult.  Awaiting hospice discussion today.    CV:  -No acute issues    Respiratory:  -No acute issues    GI:  -No acute issues    Endo:  -No acute issues    Renal:  # KELECHI  - Patient DNR comfort care.  All active treatment currently suspended.    Hematological:  -No acute issues    ID:  -No acute issues    Vent/O2: Nasal cannula  Lines/Devices: Peripheral  Drips: None  ATBx: None  Fluids: None  Diet: N.p.o.  PPX: None  Code status: DNR-comfort care  Dispo: ICU     Alvaro Acosta MD  PGY 2 critical care

## 2024-07-15 VITALS
BODY MASS INDEX: 24.46 KG/M2 | DIASTOLIC BLOOD PRESSURE: 69 MMHG | RESPIRATION RATE: 22 BRPM | OXYGEN SATURATION: 93 % | TEMPERATURE: 97.7 F | WEIGHT: 143.3 LBS | HEIGHT: 64 IN | SYSTOLIC BLOOD PRESSURE: 148 MMHG | HEART RATE: 107 BPM

## 2024-07-15 LAB
ATRIAL RATE: 72 BPM
ATRIAL RATE: 72 BPM
ATRIAL RATE: 79 BPM
ATRIAL RATE: 97 BPM
DIASTOLIC BLOOD PRESSURE: 47 MMHG
DIASTOLIC BLOOD PRESSURE: 55 MMHG
DIASTOLIC BLOOD PRESSURE: 57 MMHG
P AXIS: 70 DEGREES
P AXIS: 70 DEGREES
P AXIS: 73 DEGREES
P AXIS: 95 DEGREES
P OFFSET: 129 MS
P OFFSET: 146 MS
P OFFSET: 149 MS
P OFFSET: 155 MS
P ONSET: 101 MS
P ONSET: 106 MS
P ONSET: 92 MS
P ONSET: 97 MS
PR INTERVAL: 238 MS
PR INTERVAL: 246 MS
PR INTERVAL: 264 MS
Q ONSET: 224 MS
Q ONSET: 225 MS
QRS COUNT: 12 BEATS
QRS COUNT: 13 BEATS
QRS DURATION: 72 MS
QRS DURATION: 74 MS
QRS DURATION: 74 MS
QRS DURATION: 78 MS
QT INTERVAL: 398 MS
QT INTERVAL: 430 MS
QT INTERVAL: 450 MS
QT INTERVAL: 454 MS
QTC CALCULATION(BAZETT): 435 MS
QTC CALCULATION(BAZETT): 492 MS
QTC CALCULATION(BAZETT): 493 MS
QTC CALCULATION(BAZETT): 503 MS
QTC FREDERICIA: 423 MS
QTC FREDERICIA: 471 MS
QTC FREDERICIA: 478 MS
QTC FREDERICIA: 487 MS
R AXIS: -8 DEGREES
R AXIS: 15 DEGREES
R AXIS: 22 DEGREES
R AXIS: 22 DEGREES
SYSTOLIC BLOOD PRESSURE: 111 MMHG
SYSTOLIC BLOOD PRESSURE: 124 MMHG
SYSTOLIC BLOOD PRESSURE: 126 MMHG
T AXIS: 19 DEGREES
T AXIS: 24 DEGREES
T AXIS: 31 DEGREES
T AXIS: 37 DEGREES
T OFFSET: 424 MS
T OFFSET: 439 MS
T OFFSET: 449 MS
T OFFSET: 451 MS
VENTRICULAR RATE: 72 BPM
VENTRICULAR RATE: 72 BPM
VENTRICULAR RATE: 74 BPM
VENTRICULAR RATE: 79 BPM

## 2024-07-15 PROCEDURE — C9113 INJ PANTOPRAZOLE SODIUM, VIA: HCPCS | Performed by: INTERNAL MEDICINE

## 2024-07-15 PROCEDURE — 2500000004 HC RX 250 GENERAL PHARMACY W/ HCPCS (ALT 636 FOR OP/ED)

## 2024-07-15 PROCEDURE — 99238 HOSP IP/OBS DSCHRG MGMT 30/<: CPT

## 2024-07-15 PROCEDURE — 2500000004 HC RX 250 GENERAL PHARMACY W/ HCPCS (ALT 636 FOR OP/ED): Performed by: INTERNAL MEDICINE

## 2024-07-15 RX ORDER — HYDROMORPHONE HYDROCHLORIDE 1 MG/ML
1 INJECTION, SOLUTION INTRAMUSCULAR; INTRAVENOUS; SUBCUTANEOUS EVERY 2 HOUR PRN
Qty: 36 ML | Refills: 0 | Status: SHIPPED | OUTPATIENT
Start: 2024-07-15 | End: 2024-07-15 | Stop reason: HOSPADM

## 2024-07-15 RX ORDER — HYDROMORPHONE HYDROCHLORIDE 1 MG/ML
1 INJECTION, SOLUTION INTRAMUSCULAR; INTRAVENOUS; SUBCUTANEOUS EVERY 2 HOUR PRN
Status: DISCONTINUED | OUTPATIENT
Start: 2024-07-15 | End: 2024-07-15 | Stop reason: HOSPADM

## 2024-07-15 RX ORDER — HALOPERIDOL 0.5 MG/1
0.5 TABLET ORAL 2 TIMES DAILY
Qty: 6 TABLET | Refills: 0 | Status: SHIPPED | OUTPATIENT
Start: 2024-07-15 | End: 2024-07-18

## 2024-07-15 RX ORDER — ACETAMINOPHEN 650 MG/1
650 SUPPOSITORY RECTAL EVERY 4 HOURS PRN
Qty: 12 SUPPOSITORY | Refills: 0 | Status: SHIPPED | OUTPATIENT
Start: 2024-07-15 | End: 2024-07-18

## 2024-07-15 RX ORDER — HALOPERIDOL 5 MG/ML
0.5 INJECTION INTRAMUSCULAR EVERY 4 HOURS PRN
Qty: 1 ML | Refills: 0 | Status: SHIPPED | OUTPATIENT
Start: 2024-07-15 | End: 2024-07-15 | Stop reason: HOSPADM

## 2024-07-15 RX ORDER — HYDROMORPHONE HYDROCHLORIDE 2 MG/1
1 TABLET ORAL 3 TIMES DAILY
Qty: 5 TABLET | Refills: 0 | Status: SHIPPED | OUTPATIENT
Start: 2024-07-15 | End: 2024-07-18

## 2024-07-15 RX ORDER — HYDROMORPHONE HYDROCHLORIDE 1 MG/ML
1 INJECTION, SOLUTION INTRAMUSCULAR; INTRAVENOUS; SUBCUTANEOUS 3 TIMES DAILY
Status: DISCONTINUED | OUTPATIENT
Start: 2024-07-15 | End: 2024-07-15 | Stop reason: HOSPADM

## 2024-07-15 RX ORDER — LORAZEPAM 2 MG/ML
1 INJECTION INTRAMUSCULAR ONCE
Status: COMPLETED | OUTPATIENT
Start: 2024-07-15 | End: 2024-07-15

## 2024-07-15 RX ORDER — BISACODYL 5 MG
10 TABLET, DELAYED RELEASE (ENTERIC COATED) ORAL DAILY PRN
Status: DISCONTINUED | OUTPATIENT
Start: 2024-07-15 | End: 2024-07-15 | Stop reason: HOSPADM

## 2024-07-15 RX ORDER — HYDROMORPHONE HYDROCHLORIDE 2 MG/1
1 TABLET ORAL EVERY 2 HOUR PRN
Qty: 10 TABLET | Refills: 0 | Status: SHIPPED | OUTPATIENT
Start: 2024-07-15 | End: 2024-07-18

## 2024-07-15 RX ORDER — HYDROMORPHONE HYDROCHLORIDE 1 MG/ML
1 INJECTION, SOLUTION INTRAMUSCULAR; INTRAVENOUS; SUBCUTANEOUS 3 TIMES DAILY
Qty: 9 ML | Refills: 0 | Status: SHIPPED | OUTPATIENT
Start: 2024-07-15 | End: 2024-07-15 | Stop reason: HOSPADM

## 2024-07-15 RX ORDER — BISACODYL 5 MG
10 TABLET, DELAYED RELEASE (ENTERIC COATED) ORAL DAILY PRN
Qty: 6 TABLET | Refills: 0 | Status: SHIPPED | OUTPATIENT
Start: 2024-07-15 | End: 2024-07-18

## 2024-07-15 ASSESSMENT — PAIN SCALES - PAIN ASSESSMENT IN ADVANCED DEMENTIA (PAINAD)
BREATHING: OCCASIONAL LABORED BREATHING, SHORT PERIOD OF HYPERVENTILATION
TOTALSCORE: 7
CONSOLABILITY: DISTRACTED OR REASSURED BY VOICE/TOUCH
BODYLANGUAGE: RIGID, FISTS CLENCHED, KNEES UP, PUSHING/PULLING AWAY, STRIKES OUT
FACIALEXPRESSION: FACIAL GRIMACING
NEGVOCALIZATION: OCCASIONAL MOAN/GROAN, LOW SPEECH, NEGATIVE/DISAPPROVING QUALITY

## 2024-07-15 NOTE — DISCHARGE SUMMARY
Discharge Diagnosis  SAH (subarachnoid hemorrhage) (Multi)    Issues Requiring Follow-Up    Discharge Meds     Your medication list        CONTINUE taking these medications        Instructions Last Dose Given Next Dose Due   hydrALAZINE 25 mg tablet  Commonly known as: Apresoline      Take 1 tablet (25 mg) by mouth once daily at bedtime.                Test Results Pending At Discharge  Pending Labs       No current pending labs.            Hospital Course  Nehal Walden is a 89 y.o. female past medical history of prior breast cancer, HTN who presents to [facility] on 7/11 for left arm numbness and pain which began approximately 24 hours ago.  She also stated she had decreased dexterity of that left hand and was having issues talking.  Patient was ultimately admitted for right frontal subarachnoid hemorrhage with vasogenic edema directly to ICU.  NSGY determined no surgical intervention warranted nor did patient want surgery if indicated and family (granddaughter next of kin) ultimately decided on hospice care.  All active treatment was suspended and patient taken off of Cardene drip.  Patient was given appropriate appropriate comfort meds and discharged to [facility] with [facility].    Pertinent Physical Exam At Time of Discharge  Physical Exam    General: Discomfort/mild to moderate pain  HEENT:  Normocephalic, atraumatic  Chest: Bilateral and appropriate chest rise  CV:  Regular rate and rhythm.    Abdomen: Abdomen is soft, non-tender, non-distended. BS +   Extremities:  No lower extremity edema or cyanosis.   Neurological: Delayed speech, difficulty articulating  Skin:  Warm and dry.      Outpatient Follow-Up  Future Appointments   Date Time Provider Department Center   8/16/2024 10:00 AM Ana Laura ESTEVEZ DO [id number] [place]         Michel Regalado DO

## 2024-07-15 NOTE — NURSING NOTE
RN Hospice Note    Nehal Walden is a Hospice Patient.   Hospice terminal diagnosis: CV disease/ICH/SAH  Physician: Dr. Maradiaga  Visit type: Routine follow up with discharge    Comments/recommendations: This RN and RN varinder Gallo present for follow up visit.  Pt unresponsive.  Friend j luis and her spouse at bedside.  Pts radha Lynch, confirmed desire for pt to return to [facility] with hospice support today.  Discharge orders completed.  [facility] cnp zakia Coleman, contacted for comfort med orders, ordered po/sl dilaudid, haldol, tylenol and dulcolax rectal supp for stat delivery to [facility].  [facility]  arranged for 3 pm.  radha Lynch, called, updated on discharge plan, aware med list faxed to her and to call hospice team once pt arrives.  Cris also confirmed DME did arrive.  Thanks for the consult.      Discharge Planning:  Patient to be discharged to [facility] in [place]    Plan of care reviewed with patient/family members:  radha/paco Lynch, Courtney Graves   Plan of care reviewed with hospital staff members:  Dr.'s Maradiaga/Sabine/LUCIAN Acosta, LUCIAN Guevara    Please notify [facility] of any changes in condition. Thank you.  Office: [phone] (8 am-6:30 pm M-F and 8 am-4:30 pm weekends and holidays)   [phone] (6:30 pm-8 am M-F and 4:30 pm-8 am weekends and holidays)    Jazmin Thornton RN

## 2024-07-15 NOTE — PROGRESS NOTES
Rounded with ICU team.  This TCC provided update, patient expected to be discharging today to [facility] with hospice support, and DME on order.   This TCC met separately with [facility] contact, Jazmin Thornton RN who confirmed discharge plan and will coordinate transportation to facility.  ICU team updated for orders.  Care Transitions will continue to follow.    12:30 pm addendum  Per notes, [facility] met with family at bedside.  Confirmed discharge plan, and DME delivery to [facility].   is scheduled for 3 pm.  [facility] updated care team.  Care Transitions will continue to follow.

## 2024-08-16 ENCOUNTER — APPOINTMENT (OUTPATIENT)
Dept: PRIMARY CARE | Facility: CLINIC | Age: 89
End: 2024-08-16
Payer: COMMERCIAL